# Patient Record
Sex: FEMALE | Race: WHITE | NOT HISPANIC OR LATINO | Employment: OTHER | URBAN - METROPOLITAN AREA
[De-identification: names, ages, dates, MRNs, and addresses within clinical notes are randomized per-mention and may not be internally consistent; named-entity substitution may affect disease eponyms.]

---

## 2017-02-07 ENCOUNTER — HOSPITAL ENCOUNTER (OUTPATIENT)
Dept: LAB | Facility: MEDICAL CENTER | Age: 72
End: 2017-02-07
Attending: INTERNAL MEDICINE
Payer: MEDICARE

## 2017-02-07 DIAGNOSIS — E78.5 DYSLIPIDEMIA: ICD-10-CM

## 2017-02-07 LAB
ALBUMIN SERPL BCP-MCNC: 4 G/DL (ref 3.2–4.9)
ALBUMIN/GLOB SERPL: 1.5 G/DL
ALP SERPL-CCNC: 76 U/L (ref 30–99)
ALT SERPL-CCNC: 28 U/L (ref 2–50)
ANION GAP SERPL CALC-SCNC: 7 MMOL/L (ref 0–11.9)
AST SERPL-CCNC: 28 U/L (ref 12–45)
BILIRUB SERPL-MCNC: 0.7 MG/DL (ref 0.1–1.5)
BUN SERPL-MCNC: 13 MG/DL (ref 8–22)
CALCIUM SERPL-MCNC: 9.3 MG/DL (ref 8.5–10.5)
CHLORIDE SERPL-SCNC: 104 MMOL/L (ref 96–112)
CHOLEST SERPL-MCNC: 203 MG/DL (ref 100–199)
CO2 SERPL-SCNC: 28 MMOL/L (ref 20–33)
CREAT SERPL-MCNC: 0.71 MG/DL (ref 0.5–1.4)
GLOBULIN SER CALC-MCNC: 2.7 G/DL (ref 1.9–3.5)
GLUCOSE SERPL-MCNC: 93 MG/DL (ref 65–99)
HDLC SERPL-MCNC: 80 MG/DL
LDLC SERPL CALC-MCNC: 108 MG/DL
POTASSIUM SERPL-SCNC: 4.5 MMOL/L (ref 3.6–5.5)
PROT SERPL-MCNC: 6.7 G/DL (ref 6–8.2)
SODIUM SERPL-SCNC: 139 MMOL/L (ref 135–145)
TRIGL SERPL-MCNC: 75 MG/DL (ref 0–149)

## 2017-02-07 PROCEDURE — 36415 COLL VENOUS BLD VENIPUNCTURE: CPT

## 2017-02-07 PROCEDURE — 80053 COMPREHEN METABOLIC PANEL: CPT

## 2017-02-07 PROCEDURE — 80061 LIPID PANEL: CPT

## 2017-02-16 ENCOUNTER — OFFICE VISIT (OUTPATIENT)
Dept: CARDIOLOGY | Facility: MEDICAL CENTER | Age: 72
End: 2017-02-16
Payer: MEDICARE

## 2017-02-16 VITALS
OXYGEN SATURATION: 93 % | WEIGHT: 110 LBS | BODY MASS INDEX: 21.6 KG/M2 | HEIGHT: 60 IN | SYSTOLIC BLOOD PRESSURE: 128 MMHG | HEART RATE: 70 BPM | DIASTOLIC BLOOD PRESSURE: 60 MMHG

## 2017-02-16 DIAGNOSIS — I34.0 NON-RHEUMATIC MITRAL REGURGITATION: ICD-10-CM

## 2017-02-16 DIAGNOSIS — E78.5 DYSLIPIDEMIA: ICD-10-CM

## 2017-02-16 PROCEDURE — G8484 FLU IMMUNIZE NO ADMIN: HCPCS | Performed by: INTERNAL MEDICINE

## 2017-02-16 PROCEDURE — 1036F TOBACCO NON-USER: CPT | Performed by: INTERNAL MEDICINE

## 2017-02-16 PROCEDURE — 99213 OFFICE O/P EST LOW 20 MIN: CPT | Performed by: INTERNAL MEDICINE

## 2017-02-16 PROCEDURE — 3017F COLORECTAL CA SCREEN DOC REV: CPT | Mod: 8P | Performed by: INTERNAL MEDICINE

## 2017-02-16 PROCEDURE — G8419 CALC BMI OUT NRM PARAM NOF/U: HCPCS | Performed by: INTERNAL MEDICINE

## 2017-02-16 PROCEDURE — 1101F PT FALLS ASSESS-DOCD LE1/YR: CPT | Mod: 8P | Performed by: INTERNAL MEDICINE

## 2017-02-16 PROCEDURE — 4040F PNEUMOC VAC/ADMIN/RCVD: CPT | Mod: 8P | Performed by: INTERNAL MEDICINE

## 2017-02-16 PROCEDURE — 3014F SCREEN MAMMO DOC REV: CPT | Performed by: INTERNAL MEDICINE

## 2017-02-16 PROCEDURE — G8432 DEP SCR NOT DOC, RNG: HCPCS | Performed by: INTERNAL MEDICINE

## 2017-02-16 RX ORDER — EZETIMIBE 10 MG/1
10 TABLET ORAL DAILY
Qty: 30 TAB | Refills: 11 | Status: SHIPPED | OUTPATIENT
Start: 2017-02-16 | End: 2019-02-05

## 2017-02-16 ASSESSMENT — ENCOUNTER SYMPTOMS
DEPRESSION: 0
PND: 0
DOUBLE VISION: 0
ABDOMINAL PAIN: 0
DIZZINESS: 0
HEADACHES: 0
BLURRED VISION: 0
LOSS OF CONSCIOUSNESS: 0
COUGH: 0
MYALGIAS: 0
BLOOD IN STOOL: 0
PALPITATIONS: 0
FEVER: 0
CLAUDICATION: 0
BRUISES/BLEEDS EASILY: 0
NERVOUS/ANXIOUS: 0
ORTHOPNEA: 0
CHILLS: 0
SHORTNESS OF BREATH: 0

## 2017-02-16 NOTE — MR AVS SNAPSHOT
Estrella Mariana   2017 11:00 AM   Office Visit   MRN: [id number]    Department:  Heart [facility]   Dept Phone:  [phone]    Description:  Female : 1945   Provider:  Lor Solomon M.D.           Allergies as of 2017     Allergen Noted Reactions    Nkda [No Known Drug Allergy] 2010         You were diagnosed with     Dyslipidemia   [238651]       Non-rheumatic mitral regurgitation   [012213]         Vital Signs     Blood Pressure Pulse Height Weight Body Mass Index Oxygen Saturation    128/60 mmHg 70 1.524 m (5') 49.896 kg (110 lb) 21.48 kg/m2 93%    Smoking Status                   Former Smoker           Basic Information     Date Of Birth Sex Race Ethnicity Preferred Language    1945 Female White Non- English      Health Maintenance        Date Due Completion Dates    IMM DTaP/Tdap/Td Vaccine (1 - Tdap) 1962 ---    PAP SMEAR 1964 ---    COLONOSCOPY 1993 ---    IMM ZOSTER VACCINE 2003 ---    IMM PNEUMOCOCCAL 65+ (ADULT) LOW/MEDIUM RISK SERIES (1 of 2 - PCV13) 2008 ---    IMM INFLUENZA (1) 2016    MAMMOGRAM 10/5/2016 10/5/2015, 10/2/2014, 10/1/2013, 2012, 2011, 2010, 2010, 2009, 2009, 2008, 2008, 2007, 2007, 3/18/2005    BONE DENSITY 2019, 2012, 10/13/2009            Current Immunizations     Influenza Vaccine Adult HD 2014      Below and/or attached are the medications your provider expects you to take. Review all of your home medications and newly ordered medications with your provider and/or pharmacist. Follow medication instructions as directed by your provider and/or pharmacist. Please keep your medication list with you and share with your provider. Update the information when medications are discontinued, doses are changed, or new medications (including over-the-counter products) are added; and carry medication information at all times in the event of  emergency situations     Allergies:  NKDA - (reactions not documented)               Medications  Valid as of: February 16, 2017 - 11:33 AM    Generic Name Brand Name Tablet Size Instructions for use    Alendronate Sodium (Tab) FOSAMAX 70 MG Take 70 mg by mouth every 7 days. On Tue        Aspirin (Tablet Delayed Response) ECOTRIN 81 MG Take 81 mg by mouth every 72 hours.        Atorvastatin Calcium (Tab) LIPITOR 10 MG Take 10 mg by mouth every evening.        Cyanocobalamin (Tab) VITAMIN B-12 500 MCG Take 500 mcg by mouth every day.        Donepezil HCl (TABLET DISPERSIBLE) DONEPEZIL HYDROCHLORIDE 10 MG Take  by mouth.        Ezetimibe (Tab) ZETIA 10 MG Take 1 Tab by mouth every day.        Multiple Vitamins-Minerals (Tab) THERAGRAN-M  Take 1 Tab by mouth every day.        Omeprazole (CAPSULE DELAYED RELEASE) PRILOSEC 20 MG Take 20 mg by mouth every day.        Red Yeast Rice Extract   Take 2 Caps by mouth every day.        .                 Medicines prescribed today were sent to:     [id number] - [place], [place] - [address]    [address] [place] [address]    Phone: [phone] Fax: [phone]    Open 24 Hours?: No      Medication refill instructions:       If your prescription bottle indicates you have medication refills left, it is not necessary to call your provider’s office. Please contact your pharmacy and they will refill your medication.    If your prescription bottle indicates you do not have any refills left, you may request refills at any time through one of the following ways: The online Koru system (except Urgent Care), by calling your provider’s office, or by asking your pharmacy to contact your provider’s office with a refill request. Medication refills are processed only during regular business hours and may not be available until the next business day. Your provider may request additional information or to have a follow-up visit with you prior to refilling your medication.   *Please  Note: Medication refills are assigned a new Rx number when refilled electronically. Your pharmacy may indicate that no refills were authorized even though a new prescription for the same medication is available at the pharmacy. Please request the medicine by name with the pharmacy before contacting your provider for a refill.        Your To Do List     Future Labs/Procedures Complete By Expires    COMP METABOLIC PANEL  As directed 2/16/2018    COMP METABOLIC PANEL  As directed 2/16/2018    LIPID PROFILE  As directed 2/16/2018    LIPID PROFILE  As directed 2/16/2018         Casmul Access Code: [email or web address]  Expires: 3/9/2017  7:37 AM    Casmul  A secure, online tool to manage your health information     [employer] Casmul® is a secure, online tool that connects you to your personalized health information from the privacy of your home -- day or night - making it very easy for you to manage your healthcare. Once the activation process is completed, you can even access your medical information using the Casmul derik, which is available for free in the Apple Derik store or Google Play store.     Casmul provides the following levels of access (as shown below):   My Chart Features   [facility] Doctor [facility]  Specialists [facility]  Urgent  Care Non-[facility]  Primary Care  Doctor   Email your healthcare team securely and privately 24/7 X X X    Manage appointments: schedule your next appointment; view details of past/upcoming appointments X      Request prescription refills. X      View recent personal medical records, including lab and immunizations X X X X   View health record, including health history, allergies, medications X X X X   Read reports about your outpatient visits, procedures, consult and ER notes X X X X   See your discharge summary, which is a recap of your hospital and/or ER visit that includes your diagnosis, lab results, and care plan. X X       How to register for Casmul:  1. Go to   [email or web address].  2. Click on the Sign Up Now box, which takes you to the New Member Sign Up page. You will need to provide the following information:  a. Enter your Predikt Access Code exactly as it appears at the top of this page. (You will not need to use this code after you’ve completed the sign-up process. If you do not sign up before the expiration date, you must request a new code.)   b. Enter your date of birth.   c. Enter your home email address.   d. Click Submit, and follow the next screen’s instructions.  3. Create a Predikt ID. This will be your Predikt login ID and cannot be changed, so think of one that is secure and easy to remember.  4. Create a "[employer]t password. You can change your password at any time.  5. Enter your Password Reset Question and Answer. This can be used at a later time if you forget your password.   6. Enter your e-mail address. This allows you to receive e-mail notifications when new information is available in Predikt.  7. Click Sign Up. You can now view your health information.    For assistance activating your Predikt account, call [phone]

## 2017-02-16 NOTE — PROGRESS NOTES
Subjective:   Estrella Peña is a 71 y.o. female with prior history of bronchitis, dyslipidemia presenting today for follow-up evaluation of dyslipidemia.     Patient denied any complaints of exertional chest pain, pressure or tightness. No further episodes of palpitations. No complaints of dizziness, lightheadedness or LOC. No complaints of lower extremity edema or claudication. Denied any myalgias on Lipitor.      Past Medical History   Diagnosis Date   • Bronchitis    • Breath shortness      with exersion   • Unspecified disorder of thyroid 1968     no longer     Past Surgical History   Procedure Laterality Date   • Rhytidectomy  12/8/2010     Performed by ALCIRA SALCEDO at SURGERY [facility]   • Other  2008     urethral repair     Family History   Problem Relation Age of Onset   • Diabetes     • Cancer     • Cancer Mother    • Hypertension Sister    • Stroke Sister    • Other Father      Dementia     History   Smoking status   • Former Smoker -- 1.00 packs/day for 1 years   • Types: Cigarettes   • Quit date: 01/01/1965   Smokeless tobacco   • Never Used     Allergies   Allergen Reactions   • Nkda [No Known Drug Allergy]      Outpatient Encounter Prescriptions as of 2/16/2017   Medication Sig Dispense Refill   • atorvastatin (LIPITOR) 10 MG Tab Take 10 mg by mouth every evening.     • DONEPEZIL HYDROCHLORIDE 10 MG TABLET DISPERSIBLE Take  by mouth.     • cyanocobalamin (VITAMIN B-12) 500 MCG Tab Take 500 mcg by mouth every day.     • Red Yeast Rice Extract (RED YEAST RICE PO) Take 2 Caps by mouth every day.     • aspirin EC (ECOTRIN) 81 MG Tablet Delayed Response Take 81 mg by mouth every 72 hours.     • omeprazole (PRILOSEC) 20 MG delayed-release capsule Take 20 mg by mouth every day.     • therapeutic multivitamin-minerals (THERAGRAN-M) Tab Take 1 Tab by mouth every day.     • alendronate (FOSAMAX) 70 MG Tab Take 70 mg by mouth every 7 days. On Tue       No facility-administered encounter medications on  file as of 2/16/2017.     Review of Systems   Constitutional: Negative for fever and chills.   Eyes: Negative for blurred vision and double vision.   Respiratory: Negative for cough and shortness of breath.    Cardiovascular: Negative for chest pain, palpitations (None now), orthopnea, claudication, leg swelling and PND.   Gastrointestinal: Negative for abdominal pain, blood in stool and melena.   Genitourinary: Negative for dysuria.   Musculoskeletal: Negative for myalgias and joint pain.   Skin: Negative for rash.   Neurological: Negative for dizziness, loss of consciousness and headaches.   Endo/Heme/Allergies: Does not bruise/bleed easily.   Psychiatric/Behavioral: Negative for depression. The patient is not nervous/anxious.         Objective:   /60 mmHg  Pulse 70  Ht 1.524 m (5')  Wt 49.896 kg (110 lb)  BMI 21.48 kg/m2  SpO2 93%    Physical Exam   Constitutional: She is oriented to person, place, and time. She appears well-developed and well-nourished.   HENT:   Head: Normocephalic and atraumatic.   Eyes: Pupils are equal, round, and reactive to light. Right eye exhibits no discharge. Left eye exhibits no discharge.   Neck: No JVD present. No tracheal deviation present.   Cardiovascular: Normal rate and regular rhythm.    Murmur heard.  Pulses:       Carotid pulses are 2+ on the right side, and 2+ on the left side.       Radial pulses are 2+ on the right side, and 2+ on the left side.        Posterior tibial pulses are 2+ on the right side, and 2+ on the left side.   Systolic murmur best heard in the mitral area   Pulmonary/Chest: Breath sounds normal. No respiratory distress. She has no wheezes. She has no rales.   Abdominal: Soft. Bowel sounds are normal. She exhibits no distension. There is no tenderness. There is no rebound and no guarding.   Musculoskeletal: Normal range of motion. She exhibits no edema.   Neurological: She is alert and oriented to person, place, and time. No cranial nerve  deficit.   Skin: Skin is warm and dry. She is not diaphoretic. No erythema.   Psychiatric: She has a normal mood and affect.   Results for ROSALINO BRADY (MRN [id number]) as of 2/16/2017 12:10   9/12/2016 2/7/2017    Sodium 137 139   Potassium 3.5 (L) 4.5   Chloride 104 104   Co2 25 28   Anion Gap 8.0 7.0   Glucose 90 93   Bun 10 13   Creatinine 0.61 0.71   GFR If Non African American >60 >60   Calcium 9.2 9.3   AST(SGOT) 30 28   ALT(SGPT) 23 28   Cholesterol,Tot  203 (H)   Triglycerides  75   HDL  80   LDL  108 (H)       TTE: 10/14/16  No prior study is available for comparison.   Normal left ventricular size, wall thickness, and systolic function.  Left ventricular ejection fraction is visually estimated to be 60%. Grade I diastolic dysfunction.  The right ventricle was normal in size and function.  Mild mitral regurgitation.  Mild tricuspid regurgitation. Estimated right ventricular systolic pressure  is 25 mmHg.  Mild pulmonic insufficiency.  Normal pericardium without effusion    Assessment:     1. Dyslipidemia  2. Mitral regurgitation    Medical Decision Making:  Today's Assessment / Status / Plan:     1. Continue Lipitor 10 mg qhs.  Start Zetia 10 mg daily next line check labs in 2 months and in one year  2. Stable on exam.  Repeat echo in 4 years prior to next visit.    Follow-up in one year.  Labs in 2 months than in one year.    Thank you for allowing me to participate in taking care of patient.    Lor Jenkins MD.    Thank you for allowing me to participate in taking care of patient.    Lor Jenkins MD.

## 2017-02-16 NOTE — Clinical Note
[facility]   [address]  [place] [address]  Phone: [phone]  Fax: [phone]              Estrella Peña  1945    Encounter Date: 2/16/2017    Lor Solomon M.D.          PROGRESS NOTE:  Subjective:   Estrella Peña is a 71 y.o. female with prior history of bronchitis, dyslipidemia presenting today for follow-up evaluation of dyslipidemia.     Patient denied any complaints of exertional chest pain, pressure or tightness. No further episodes of palpitations. No complaints of dizziness, lightheadedness or LOC. No complaints of lower extremity edema or claudication. Denied any myalgias on Lipitor.      Past Medical History   Diagnosis Date   • Bronchitis    • Breath shortness      with exersion   • Unspecified disorder of thyroid 1968     no longer     Past Surgical History   Procedure Laterality Date   • Rhytidectomy  12/8/2010     Performed by ALCIRA SALCEDO at SURGERY [facility]   • Other  2008     urethral repair     Family History   Problem Relation Age of Onset   • Diabetes     • Cancer     • Cancer Mother    • Hypertension Sister    • Stroke Sister    • Other Father      Dementia     History   Smoking status   • Former Smoker -- 1.00 packs/day for 1 years   • Types: Cigarettes   • Quit date: 01/01/1965   Smokeless tobacco   • Never Used     Allergies   Allergen Reactions   • Nkda [No Known Drug Allergy]      Outpatient Encounter Prescriptions as of 2/16/2017   Medication Sig Dispense Refill   • atorvastatin (LIPITOR) 10 MG Tab Take 10 mg by mouth every evening.     • DONEPEZIL HYDROCHLORIDE 10 MG TABLET DISPERSIBLE Take  by mouth.     • cyanocobalamin (VITAMIN B-12) 500 MCG Tab Take 500 mcg by mouth every day.     • Red Yeast Rice Extract (RED YEAST RICE PO) Take 2 Caps by mouth every day.     • aspirin EC (ECOTRIN) 81 MG Tablet Delayed Response Take 81 mg by mouth every 72 hours.     • omeprazole (PRILOSEC) 20 MG  delayed-release capsule Take 20 mg by mouth every day.     • therapeutic multivitamin-minerals (THERAGRAN-M) Tab Take 1 Tab by mouth every day.     • alendronate (FOSAMAX) 70 MG Tab Take 70 mg by mouth every 7 days. On Tue       No facility-administered encounter medications on file as of 2/16/2017.     Review of Systems   Constitutional: Negative for fever and chills.   Eyes: Negative for blurred vision and double vision.   Respiratory: Negative for cough and shortness of breath.    Cardiovascular: Negative for chest pain, palpitations (None now), orthopnea, claudication, leg swelling and PND.   Gastrointestinal: Negative for abdominal pain, blood in stool and melena.   Genitourinary: Negative for dysuria.   Musculoskeletal: Negative for myalgias and joint pain.   Skin: Negative for rash.   Neurological: Negative for dizziness, loss of consciousness and headaches.   Endo/Heme/Allergies: Does not bruise/bleed easily.   Psychiatric/Behavioral: Negative for depression. The patient is not nervous/anxious.         Objective:   /60 mmHg  Pulse 70  Ht 1.524 m (5')  Wt 49.896 kg (110 lb)  BMI 21.48 kg/m2  SpO2 93%    Physical Exam   Constitutional: She is oriented to person, place, and time. She appears well-developed and well-nourished.   HENT:   Head: Normocephalic and atraumatic.   Eyes: Pupils are equal, round, and reactive to light. Right eye exhibits no discharge. Left eye exhibits no discharge.   Neck: No JVD present. No tracheal deviation present.   Cardiovascular: Normal rate and regular rhythm.    Murmur heard.  Pulses:       Carotid pulses are 2+ on the right side, and 2+ on the left side.       Radial pulses are 2+ on the right side, and 2+ on the left side.        Posterior tibial pulses are 2+ on the right side, and 2+ on the left side.   Systolic murmur best heard in the mitral area   Pulmonary/Chest: Breath sounds normal. No respiratory distress. She has no wheezes. She has no rales.   Abdominal:  Soft. Bowel sounds are normal. She exhibits no distension. There is no tenderness. There is no rebound and no guarding.   Musculoskeletal: Normal range of motion. She exhibits no edema.   Neurological: She is alert and oriented to person, place, and time. No cranial nerve deficit.   Skin: Skin is warm and dry. She is not diaphoretic. No erythema.   Psychiatric: She has a normal mood and affect.   Results for ROSALINO BRADY (MRN [id number]) as of 2/16/2017 12:10   9/12/2016 2/7/2017    Sodium 137 139   Potassium 3.5 (L) 4.5   Chloride 104 104   Co2 25 28   Anion Gap 8.0 7.0   Glucose 90 93   Bun 10 13   Creatinine 0.61 0.71   GFR If Non African American >60 >60   Calcium 9.2 9.3   AST(SGOT) 30 28   ALT(SGPT) 23 28   Cholesterol,Tot  203 (H)   Triglycerides  75   HDL  80   LDL  108 (H)       TTE: 10/14/16  No prior study is available for comparison.   Normal left ventricular size, wall thickness, and systolic function.  Left ventricular ejection fraction is visually estimated to be 60%. Grade I diastolic dysfunction.  The right ventricle was normal in size and function.  Mild mitral regurgitation.  Mild tricuspid regurgitation. Estimated right ventricular systolic pressure  is 25 mmHg.  Mild pulmonic insufficiency.  Normal pericardium without effusion    Assessment:     1. Dyslipidemia  2. Mitral regurgitation    Medical Decision Making:  Today's Assessment / Status / Plan:     1. Continue Lipitor 10 mg qhs.  Start Zetia 10 mg daily next line check labs in 2 months and in one year  2. Stable on exam.  Repeat echo in 4 years prior to next visit.    Follow-up in one year.  Labs in 2 months than in one year.    Thank you for allowing me to participate in taking care of patient.    Lor Jenkins MD.    Thank you for allowing me to participate in taking care of patient.    Lor Jenkins MD.        Anne Beltrán M.D.  [address]  [place] [address]  VIA Facsimile: [phone]

## 2017-04-05 ENCOUNTER — HOSPITAL ENCOUNTER (OUTPATIENT)
Facility: MEDICAL CENTER | Age: 72
End: 2017-04-05
Attending: PHYSICIAN ASSISTANT
Payer: MEDICARE

## 2017-04-05 LAB — CYTOLOGY REG CYTOL: NORMAL

## 2017-04-05 PROCEDURE — 88112 CYTOPATH CELL ENHANCE TECH: CPT

## 2017-04-13 ENCOUNTER — HOSPITAL ENCOUNTER (OUTPATIENT)
Dept: LAB | Facility: MEDICAL CENTER | Age: 72
End: 2017-04-13
Attending: INTERNAL MEDICINE
Payer: MEDICARE

## 2017-04-13 DIAGNOSIS — E78.5 DYSLIPIDEMIA: ICD-10-CM

## 2017-04-13 LAB
ALBUMIN SERPL BCP-MCNC: 3.9 G/DL (ref 3.2–4.9)
ALBUMIN/GLOB SERPL: 1.4 G/DL
ALP SERPL-CCNC: 60 U/L (ref 30–99)
ALT SERPL-CCNC: 28 U/L (ref 2–50)
ANION GAP SERPL CALC-SCNC: 5 MMOL/L (ref 0–11.9)
AST SERPL-CCNC: 27 U/L (ref 12–45)
BILIRUB SERPL-MCNC: 0.4 MG/DL (ref 0.1–1.5)
BUN SERPL-MCNC: 14 MG/DL (ref 8–22)
CALCIUM SERPL-MCNC: 9 MG/DL (ref 8.5–10.5)
CHLORIDE SERPL-SCNC: 103 MMOL/L (ref 96–112)
CHOLEST SERPL-MCNC: 205 MG/DL (ref 100–199)
CO2 SERPL-SCNC: 29 MMOL/L (ref 20–33)
CREAT SERPL-MCNC: 0.63 MG/DL (ref 0.5–1.4)
GFR SERPL CREATININE-BSD FRML MDRD: >60 ML/MIN/1.73 M 2
GLOBULIN SER CALC-MCNC: 2.8 G/DL (ref 1.9–3.5)
GLUCOSE SERPL-MCNC: 93 MG/DL (ref 65–99)
HDLC SERPL-MCNC: 84 MG/DL
LDLC SERPL CALC-MCNC: 111 MG/DL
POTASSIUM SERPL-SCNC: 4.4 MMOL/L (ref 3.6–5.5)
PROT SERPL-MCNC: 6.7 G/DL (ref 6–8.2)
SODIUM SERPL-SCNC: 137 MMOL/L (ref 135–145)
TRIGL SERPL-MCNC: 48 MG/DL (ref 0–149)

## 2017-04-13 PROCEDURE — 80053 COMPREHEN METABOLIC PANEL: CPT

## 2017-04-13 PROCEDURE — 36415 COLL VENOUS BLD VENIPUNCTURE: CPT

## 2017-04-13 PROCEDURE — 80061 LIPID PANEL: CPT

## 2017-04-24 ENCOUNTER — TELEPHONE (OUTPATIENT)
Dept: CARDIOLOGY | Facility: MEDICAL CENTER | Age: 72
End: 2017-04-24

## 2017-04-24 DIAGNOSIS — E78.49 OTHER HYPERLIPIDEMIA: ICD-10-CM

## 2017-04-24 PROBLEM — I34.0 MITRAL REGURGITATION: Status: ACTIVE | Noted: 2017-04-24

## 2017-04-24 PROBLEM — E78.5 HYPERLIPIDEMIA: Status: ACTIVE | Noted: 2017-04-24

## 2017-04-24 NOTE — TELEPHONE ENCOUNTER
Spoke with patient to discuss lipid panel results and she states that she is only taking the zetia as she thought that because AM said she was doing well she was to stop the lipitor and start the zetia - she did not realize that she was to take them together so she stopped taking the lipitor. I explained to her that is probably why we did not see her cholesterol come down and in fact it went up just a couple points.  I explained to her why we use zetia in combination with a statin drug and she verbalized understanding of the need to take both and will refill her zetia and start taking both together and will recheck her labs in 2 months when she comes back in town the first week in July - she requested that lab slip be mailed to her and I confirmed that I will get it out in the mail to her today - confirmed address

## 2017-05-04 ENCOUNTER — HOSPITAL ENCOUNTER (OUTPATIENT)
Dept: RADIOLOGY | Facility: MEDICAL CENTER | Age: 72
End: 2017-05-04
Attending: FAMILY MEDICINE
Payer: MEDICARE

## 2017-05-04 DIAGNOSIS — Z13.9 SCREENING: ICD-10-CM

## 2017-05-04 PROCEDURE — 77063 BREAST TOMOSYNTHESIS BI: CPT

## 2017-05-12 ENCOUNTER — HOSPITAL ENCOUNTER (OUTPATIENT)
Dept: RADIOLOGY | Facility: MEDICAL CENTER | Age: 72
End: 2017-05-12
Attending: ORTHOPAEDIC SURGERY
Payer: MEDICARE

## 2017-05-12 DIAGNOSIS — M48.061 SPINAL STENOSIS, LUMBAR REGION, WITHOUT NEUROGENIC CLAUDICATION: ICD-10-CM

## 2017-05-12 DIAGNOSIS — M51.36 DEGENERATION OF LUMBAR INTERVERTEBRAL DISC: ICD-10-CM

## 2017-05-12 PROCEDURE — 72148 MRI LUMBAR SPINE W/O DYE: CPT

## 2017-06-23 ENCOUNTER — OFFICE VISIT (OUTPATIENT)
Dept: CARDIOLOGY | Facility: MEDICAL CENTER | Age: 72
End: 2017-06-23
Payer: MEDICARE

## 2017-06-23 VITALS
HEART RATE: 78 BPM | SYSTOLIC BLOOD PRESSURE: 102 MMHG | DIASTOLIC BLOOD PRESSURE: 68 MMHG | WEIGHT: 109 LBS | HEIGHT: 60 IN | OXYGEN SATURATION: 93 % | BODY MASS INDEX: 21.4 KG/M2

## 2017-06-23 DIAGNOSIS — E78.5 DYSLIPIDEMIA: ICD-10-CM

## 2017-06-23 DIAGNOSIS — R00.2 PALPITATIONS: ICD-10-CM

## 2017-06-23 DIAGNOSIS — I34.0 NON-RHEUMATIC MITRAL REGURGITATION: ICD-10-CM

## 2017-06-23 PROCEDURE — 99214 OFFICE O/P EST MOD 30 MIN: CPT | Performed by: INTERNAL MEDICINE

## 2017-06-23 PROCEDURE — 93000 ELECTROCARDIOGRAM COMPLETE: CPT | Performed by: INTERNAL MEDICINE

## 2017-06-23 ASSESSMENT — ENCOUNTER SYMPTOMS
CHILLS: 0
NERVOUS/ANXIOUS: 0
FEVER: 0
COUGH: 0
HEADACHES: 0
SHORTNESS OF BREATH: 0
BLURRED VISION: 0
LOSS OF CONSCIOUSNESS: 0
BLOOD IN STOOL: 0
DEPRESSION: 0
MYALGIAS: 0
ORTHOPNEA: 0
BRUISES/BLEEDS EASILY: 0
CLAUDICATION: 0
PND: 0
ABDOMINAL PAIN: 0
DOUBLE VISION: 0
DIZZINESS: 0
PALPITATIONS: 0

## 2017-06-23 NOTE — PROGRESS NOTES
Subjective:   Estrella Peña is a 71 y.o. female with prior history of bronchitis, dyslipidemia recently saw Dr. Oro 2 to irregular heartbeat patient was advised to see me in office.    Patient denied any complaints of palpitations. Patient currently on Red rice yeast. No complaints of dizziness, lightheadedness or LOC. No complaints of lower extremity edema or claudication. No complaints of exertional chest pain pressure or tightness. Denied any complaints of palpitations orthopnea or PND.  Past Medical History   Diagnosis Date   • Bronchitis    • Breath shortness      With exertion   • Unspecified disorder of thyroid 1968     No longer   • Hyperlipidemia    • Mitral regurgitation      Past Surgical History   Procedure Laterality Date   • Rhytidectomy  12/8/2010     Performed by ALCIRA SALCEDO at [facility] ORS   • Other  2008     urethral repair     Family History   Problem Relation Age of Onset   • Diabetes     • Cancer     • Cancer Mother    • Hypertension Sister    • Stroke Sister    • Other Father      Dementia     History   Smoking status   • Former Smoker -- 1.00 packs/day for 1 years   • Types: Cigarettes   • Quit date: 01/01/1965   Smokeless tobacco   • Never Used     Allergies   Allergen Reactions   • Nkda [No Known Drug Allergy]      Outpatient Encounter Prescriptions as of 6/23/2017   Medication Sig Dispense Refill   • ezetimibe (ZETIA) 10 MG Tab Take 1 Tab by mouth every day. 30 Tab 11   • atorvastatin (LIPITOR) 10 MG Tab Take 10 mg by mouth every evening.     • DONEPEZIL HYDROCHLORIDE 10 MG TABLET DISPERSIBLE Take  by mouth.     • aspirin EC (ECOTRIN) 81 MG Tablet Delayed Response Take 81 mg by mouth every 72 hours.     • omeprazole (PRILOSEC) 20 MG delayed-release capsule Take 20 mg by mouth every day.     • therapeutic multivitamin-minerals (THERAGRAN-M) Tab Take 1 Tab by mouth every day.     • alendronate (FOSAMAX) 70 MG Tab Take 70 mg by mouth every 7 days. On Tue     •  cyanocobalamin (VITAMIN B-12) 500 MCG Tab Take 500 mcg by mouth every day.     • Red Yeast Rice Extract (RED YEAST RICE PO) Take 2 Caps by mouth every day.       No facility-administered encounter medications on file as of 6/23/2017.     Review of Systems   Constitutional: Negative for fever and chills.   Eyes: Negative for blurred vision and double vision.   Respiratory: Negative for cough and shortness of breath.    Cardiovascular: Negative for chest pain, palpitations (None now), orthopnea, claudication, leg swelling and PND.   Gastrointestinal: Negative for abdominal pain, blood in stool and melena.   Genitourinary: Negative for dysuria.   Musculoskeletal: Negative for myalgias and joint pain.   Skin: Negative for rash.   Neurological: Negative for dizziness, loss of consciousness and headaches.   Endo/Heme/Allergies: Does not bruise/bleed easily.   Psychiatric/Behavioral: Negative for depression. The patient is not nervous/anxious.         Objective:   /68 mmHg  Pulse 78  Ht 1.524 m (5')  Wt 49.442 kg (109 lb)  BMI 21.29 kg/m2  SpO2 93%    Physical Exam   Constitutional: She is oriented to person, place, and time. She appears well-developed and well-nourished.   HENT:   Head: Normocephalic and atraumatic.   Eyes: Pupils are equal, round, and reactive to light. Right eye exhibits no discharge. Left eye exhibits no discharge.   Neck: No JVD present. No tracheal deviation present.   Cardiovascular: Normal rate.  A regularly irregular rhythm present.   Murmur heard.  Pulses:       Carotid pulses are 2+ on the right side, and 2+ on the left side.       Radial pulses are 2+ on the right side, and 2+ on the left side.        Posterior tibial pulses are 2+ on the right side, and 2+ on the left side.   Systolic murmur best heard in the mitral area   Pulmonary/Chest: Breath sounds normal. No respiratory distress. She has no wheezes. She has no rales.   Abdominal: Soft. Bowel sounds are normal. She exhibits no  distension. There is no tenderness. There is no rebound and no guarding.   Musculoskeletal: Normal range of motion. She exhibits no edema.   Neurological: She is alert and oriented to person, place, and time. No cranial nerve deficit.   Skin: Skin is warm and dry. She is not diaphoretic. No erythema.   Psychiatric: She has a normal mood and affect.     EK17  EKG personally reviewed by me.   Sinus rhythm 60 bpm, less than 0.5 mm ST depression in inferolateral leads. Unchanged from prior EKG.    Results for ROSALINO BRADY (MRN [id number]) as of 2017 10:23   2017    Sodium 139 137   Potassium 4.5 4.4   Chloride 104 103   Co2 28 29   Anion Gap 7.0 5.0   Glucose 93 93   Bun 13 14   Creatinine 0.71 0.63   GFR If Non African American >60 >60   Calcium 9.3 9.0   AST(SGOT) 28 27   ALT(SGPT) 28 28   Alkaline Phosphatase 76 60   Cholesterol,Tot 203 (H) 205 (H)   Triglycerides 75 48   HDL 80 84    (H) 111 (H)     TTE: 10/14/16  No prior study is available for comparison.   Normal left ventricular size, wall thickness, and systolic function.  Left ventricular ejection fraction is visually estimated to be 60%. Grade I diastolic dysfunction.  The right ventricle was normal in size and function.  Mild mitral regurgitation.  Mild tricuspid regurgitation. Estimated right ventricular systolic pressure  is 25 mmHg.  Mild pulmonic insufficiency.  Normal pericardium without effusion    Assessment:     1. Dyslipidemia  2. Mitral regurgitation  3. Irregular heartbeat  Medical Decision Making:  Today's Assessment / Status / Plan:     1. Continue Red Rice yeast.  Start Zetia 10 mg daily.  Check labs in 6 weeks.  2. Repeat echocardiogram in 4 years.  3. 24-hour Holter monitor prior to next visit.    Follow-up in 8 weeks.  24-hour Holter monitor prior to next visit.  Labs prior to next visit.    Thank you for allowing me to participate in taking care of patient.    Lor Jenkins MD.

## 2017-06-23 NOTE — Clinical Note
[facility]   [address]   [address]  [place] [address]  Phone: [phone]  Fax: [phone]              Estrella Peña  1945    Encounter Date: 6/23/2017    Lor Soloomn M.D.          PROGRESS NOTE:  Subjective:   Estrella Peña is a 71 y.o. female with prior history of bronchitis, dyslipidemia recently saw Dr. Oro 2 to irregular heartbeat patient was advised to see me in office.    Patient denied any complaints of palpitations. Patient currently on Red rice yeast. No complaints of dizziness, lightheadedness or LOC. No complaints of lower extremity edema or claudication. No complaints of exertional chest pain pressure or tightness. Denied any complaints of palpitations orthopnea or PND.  Past Medical History   Diagnosis Date   • Bronchitis    • Breath shortness      With exertion   • Unspecified disorder of thyroid 1968     No longer   • Hyperlipidemia    • Mitral regurgitation      Past Surgical History   Procedure Laterality Date   • Rhytidectomy  12/8/2010     Performed by ALCIRA SALCEDO at [facility]   • Other  2008     urethral repair     Family History   Problem Relation Age of Onset   • Diabetes     • Cancer     • Cancer Mother    • Hypertension Sister    • Stroke Sister    • Other Father      Dementia     History   Smoking status   • Former Smoker -- 1.00 packs/day for 1 years   • Types: Cigarettes   • Quit date: 01/01/1965   Smokeless tobacco   • Never Used     Allergies   Allergen Reactions   • Nkda [No Known Drug Allergy]      Outpatient Encounter Prescriptions as of 6/23/2017   Medication Sig Dispense Refill   • ezetimibe (ZETIA) 10 MG Tab Take 1 Tab by mouth every day. 30 Tab 11   • atorvastatin (LIPITOR) 10 MG Tab Take 10 mg by mouth every evening.     • DONEPEZIL HYDROCHLORIDE 10 MG TABLET DISPERSIBLE Take  by mouth.     • aspirin EC (ECOTRIN) 81 MG Tablet Delayed Response Take 81 mg by mouth every  72 hours.     • omeprazole (PRILOSEC) 20 MG delayed-release capsule Take 20 mg by mouth every day.     • therapeutic multivitamin-minerals (THERAGRAN-M) Tab Take 1 Tab by mouth every day.     • alendronate (FOSAMAX) 70 MG Tab Take 70 mg by mouth every 7 days. On Tue     • cyanocobalamin (VITAMIN B-12) 500 MCG Tab Take 500 mcg by mouth every day.     • Red Yeast Rice Extract (RED YEAST RICE PO) Take 2 Caps by mouth every day.       No facility-administered encounter medications on file as of 6/23/2017.     Review of Systems   Constitutional: Negative for fever and chills.   Eyes: Negative for blurred vision and double vision.   Respiratory: Negative for cough and shortness of breath.    Cardiovascular: Negative for chest pain, palpitations (None now), orthopnea, claudication, leg swelling and PND.   Gastrointestinal: Negative for abdominal pain, blood in stool and melena.   Genitourinary: Negative for dysuria.   Musculoskeletal: Negative for myalgias and joint pain.   Skin: Negative for rash.   Neurological: Negative for dizziness, loss of consciousness and headaches.   Endo/Heme/Allergies: Does not bruise/bleed easily.   Psychiatric/Behavioral: Negative for depression. The patient is not nervous/anxious.         Objective:   /68 mmHg  Pulse 78  Ht 1.524 m (5')  Wt 49.442 kg (109 lb)  BMI 21.29 kg/m2  SpO2 93%    Physical Exam   Constitutional: She is oriented to person, place, and time. She appears well-developed and well-nourished.   HENT:   Head: Normocephalic and atraumatic.   Eyes: Pupils are equal, round, and reactive to light. Right eye exhibits no discharge. Left eye exhibits no discharge.   Neck: No JVD present. No tracheal deviation present.   Cardiovascular: Normal rate.  A regularly irregular rhythm present.   Murmur heard.  Pulses:       Carotid pulses are 2+ on the right side, and 2+ on the left side.       Radial pulses are 2+ on the right side, and 2+ on the left side.        Posterior  tibial pulses are 2+ on the right side, and 2+ on the left side.   Systolic murmur best heard in the mitral area   Pulmonary/Chest: Breath sounds normal. No respiratory distress. She has no wheezes. She has no rales.   Abdominal: Soft. Bowel sounds are normal. She exhibits no distension. There is no tenderness. There is no rebound and no guarding.   Musculoskeletal: Normal range of motion. She exhibits no edema.   Neurological: She is alert and oriented to person, place, and time. No cranial nerve deficit.   Skin: Skin is warm and dry. She is not diaphoretic. No erythema.   Psychiatric: She has a normal mood and affect.     EK17  EKG personally reviewed by me.   Sinus rhythm 60 bpm, less than 0.5 mm ST depression in inferolateral leads. Unchanged from prior EKG.    Results for ROSALINO BRADY (MRN [id number]) as of 2017 10:23   2017    Sodium 139 137   Potassium 4.5 4.4   Chloride 104 103   Co2 28 29   Anion Gap 7.0 5.0   Glucose 93 93   Bun 13 14   Creatinine 0.71 0.63   GFR If Non African American >60 >60   Calcium 9.3 9.0   AST(SGOT) 28 27   ALT(SGPT) 28 28   Alkaline Phosphatase 76 60   Cholesterol,Tot 203 (H) 205 (H)   Triglycerides 75 48   HDL 80 84    (H) 111 (H)     TTE: 10/14/16  No prior study is available for comparison.   Normal left ventricular size, wall thickness, and systolic function.  Left ventricular ejection fraction is visually estimated to be 60%. Grade I diastolic dysfunction.  The right ventricle was normal in size and function.  Mild mitral regurgitation.  Mild tricuspid regurgitation. Estimated right ventricular systolic pressure  is 25 mmHg.  Mild pulmonic insufficiency.  Normal pericardium without effusion    Assessment:     1. Dyslipidemia  2. Mitral regurgitation  3. Irregular heartbeat  Medical Decision Making:  Today's Assessment / Status / Plan:     1. Continue Red Rice yeast.  Start Zetia 10 mg daily.  Check labs in 6 weeks.  2. Repeat echocardiogram in  4 years.  3. 24-hour Holter monitor prior to next visit.    Follow-up in 8 weeks.  24-hour Holter monitor prior to next visit.  Labs prior to next visit.    Thank you for allowing me to participate in taking care of patient.    Lor Solomon. MD.          Anne Beltrán M.D.  [address]  [address]  [facility] [address]  VIA Facsimile: [phone]

## 2017-06-23 NOTE — MR AVS SNAPSHOT
Estrella Sierramami   2017 10:00 AM   Office Visit   MRN: [id number]    Department:  Heart [facility] [place]   Dept Phone:  [phone]    Description:  Female : 1945   Provider:  Lor Solomon M.D.           Allergies as of 2017     Allergen Noted Reactions    Nkda [No Known Drug Allergy] 2010         You were diagnosed with     Non-rheumatic mitral regurgitation   [221129]       Dyslipidemia   [750015]       Palpitations   [785.1.ICD-9-CM]         Vital Signs     Blood Pressure Pulse Height Weight Body Mass Index Oxygen Saturation    102/68 mmHg 78 1.524 m (5') 49.442 kg (109 lb) 21.29 kg/m2 93%    Smoking Status                   Former Smoker           Basic Information     Date Of Birth Sex Race Ethnicity Preferred Language    1945 Female White Non- English      Problem List              ICD-10-CM Priority Class Noted - Resolved    Hyperlipidemia E78.5   2017 - Present    Mitral regurgitation I34.0   2017 - Present      Health Maintenance        Date Due Completion Dates    IMM DTaP/Tdap/Td Vaccine (1 - Tdap) 1962 ---    PAP SMEAR 1964 ---    COLONOSCOPY 1993 ---    IMM ZOSTER VACCINE 2003 ---    IMM PNEUMOCOCCAL 65+ (ADULT) LOW/MEDIUM RISK SERIES (1 of 2 - PCV13) 2008 ---    MAMMOGRAM 2018, 10/5/2015, 10/2/2014, 10/1/2013, 2012, 2011, 2010, 2010, 2009, 2009, 2008, 2008, 2007, 2007, 3/18/2005    BONE DENSITY 2019, 2012, 10/13/2009            Results       Current Immunizations     Influenza Vaccine Adult HD 2014      Below and/or attached are the medications your provider expects you to take. Review all of your home medications and newly ordered medications with your provider and/or pharmacist. Follow medication instructions as directed by your provider and/or pharmacist. Please keep your medication list with you and share with your provider. Update the  information when medications are discontinued, doses are changed, or new medications (including over-the-counter products) are added; and carry medication information at all times in the event of emergency situations     Allergies:  NKDA - (reactions not documented)               Medications  Valid as of: June 23, 2017 - 11:12 AM    Generic Name Brand Name Tablet Size Instructions for use    Alendronate Sodium (Tab) FOSAMAX 70 MG Take 70 mg by mouth every 7 days. On Tue        Aspirin (Tablet Delayed Response) ECOTRIN 81 MG Take 81 mg by mouth every 72 hours.        Atorvastatin Calcium (Tab) LIPITOR 10 MG Take 10 mg by mouth every evening.        Cyanocobalamin (Tab) VITAMIN B-12 500 MCG Take 500 mcg by mouth every day.        Donepezil HCl (TABLET DISPERSIBLE) DONEPEZIL HYDROCHLORIDE 10 MG Take  by mouth.        Ezetimibe (Tab) ZETIA 10 MG Take 1 Tab by mouth every day.        Multiple Vitamins-Minerals (Tab) THERAGRAN-M  Take 1 Tab by mouth every day.        Omeprazole (CAPSULE DELAYED RELEASE) PRILOSEC 20 MG Take 20 mg by mouth every day.        Red Yeast Rice Extract   Take 2 Caps by mouth every day.        .                 Medicines prescribed today were sent to:     [id number] - [place], [place] - [address]    [address] [place] [address]    Phone: [phone] Fax: [phone]    Open 24 Hours?: No      Medication refill instructions:       If your prescription bottle indicates you have medication refills left, it is not necessary to call your provider’s office. Please contact your pharmacy and they will refill your medication.    If your prescription bottle indicates you do not have any refills left, you may request refills at any time through one of the following ways: The online [employer] system (except Urgent Care), by calling your provider’s office, or by asking your pharmacy to contact your provider’s office with a refill request. Medication refills are processed only during regular business  hours and may not be available until the next business day. Your provider may request additional information or to have a follow-up visit with you prior to refilling your medication.   *Please Note: Medication refills are assigned a new Rx number when refilled electronically. Your pharmacy may indicate that no refills were authorized even though a new prescription for the same medication is available at the pharmacy. Please request the medicine by name with the pharmacy before contacting your provider for a refill.           Touchstorm Access Code: [email or web address]  Expires: 7/8/2017  4:14 AM    Touchstorm  A secure, online tool to manage your health information     [employer]’s Touchstorm® is a secure, online tool that connects you to your personalized health information from the privacy of your home -- day or night - making it very easy for you to manage your healthcare. Once the activation process is completed, you can even access your medical information using the Touchstorm derik, which is available for free in the Apple Derik store or Google Play store.     Touchstorm provides the following levels of access (as shown below):   My Chart Features   [facility]  Specialists [facility]  Urgent  Care Non-[facility]  Primary Care  Doctor   Email your healthcare team securely and privately 24/7 X X X    Manage appointments: schedule your next appointment; view details of past/upcoming appointments X      Request prescription refills. X      View recent personal medical records, including lab and immunizations X X X X   View health record, including health history, allergies, medications X X X X   Read reports about your outpatient visits, procedures, consult and ER notes X X X X   See your discharge summary, which is a recap of your hospital and/or ER visit that includes your diagnosis, lab results, and care plan. X X       How to register for Touchstorm:  1. Go to  [email or web address].  2. Click on the Sign Up  Now box, which takes you to the New Member Sign Up page. You will need to provide the following information:  a. Enter your WorldTV Access Code exactly as it appears at the top of this page. (You will not need to use this code after you’ve completed the sign-up process. If you do not sign up before the expiration date, you must request a new code.)   b. Enter your date of birth.   c. Enter your home email address.   d. Click Submit, and follow the next screen’s instructions.  3. Create a WorldTV ID. This will be your WorldTV login ID and cannot be changed, so think of one that is secure and easy to remember.  4. Create a WorldTV password. You can change your password at any time.  5. Enter your Password Reset Question and Answer. This can be used at a later time if you forget your password.   6. Enter your e-mail address. This allows you to receive e-mail notifications when new information is available in WorldTV.  7. Click Sign Up. You can now view your health information.    For assistance activating your WorldTV account, call [phone]

## 2017-06-25 LAB — EKG IMPRESSION: NORMAL

## 2017-07-13 ENCOUNTER — HOSPITAL ENCOUNTER (OUTPATIENT)
Dept: LAB | Facility: MEDICAL CENTER | Age: 72
End: 2017-07-13
Attending: FAMILY MEDICINE
Payer: MEDICARE

## 2017-07-13 LAB
ALBUMIN SERPL BCP-MCNC: 3.9 G/DL (ref 3.2–4.9)
ALBUMIN/GLOB SERPL: 1.5 G/DL
ALP SERPL-CCNC: 66 U/L (ref 30–99)
ALT SERPL-CCNC: 37 U/L (ref 2–50)
ANION GAP SERPL CALC-SCNC: 8 MMOL/L (ref 0–11.9)
AST SERPL-CCNC: 32 U/L (ref 12–45)
BASOPHILS # BLD AUTO: 0.7 % (ref 0–1.8)
BASOPHILS # BLD: 0.04 K/UL (ref 0–0.12)
BILIRUB SERPL-MCNC: 0.5 MG/DL (ref 0.1–1.5)
BUN SERPL-MCNC: 10 MG/DL (ref 8–22)
CALCIUM SERPL-MCNC: 9.1 MG/DL (ref 8.5–10.5)
CHLORIDE SERPL-SCNC: 106 MMOL/L (ref 96–112)
CHOLEST SERPL-MCNC: 152 MG/DL (ref 100–199)
CO2 SERPL-SCNC: 28 MMOL/L (ref 20–33)
CREAT SERPL-MCNC: 0.65 MG/DL (ref 0.5–1.4)
EOSINOPHIL # BLD AUTO: 0.09 K/UL (ref 0–0.51)
EOSINOPHIL NFR BLD: 1.6 % (ref 0–6.9)
ERYTHROCYTE [DISTWIDTH] IN BLOOD BY AUTOMATED COUNT: 53.3 FL (ref 35.9–50)
FOLATE SERPL-MCNC: >24 NG/ML
GFR SERPL CREATININE-BSD FRML MDRD: >60 ML/MIN/1.73 M 2
GLOBULIN SER CALC-MCNC: 2.6 G/DL (ref 1.9–3.5)
GLUCOSE SERPL-MCNC: 83 MG/DL (ref 65–99)
HCT VFR BLD AUTO: 42.9 % (ref 37–47)
HDLC SERPL-MCNC: 81 MG/DL
HGB BLD-MCNC: 13.7 G/DL (ref 12–16)
IMM GRANULOCYTES # BLD AUTO: 0.01 K/UL (ref 0–0.11)
IMM GRANULOCYTES NFR BLD AUTO: 0.2 % (ref 0–0.9)
LDLC SERPL CALC-MCNC: 63 MG/DL
LYMPHOCYTES # BLD AUTO: 2.44 K/UL (ref 1–4.8)
LYMPHOCYTES NFR BLD: 42.8 % (ref 22–41)
MCH RBC QN AUTO: 32.5 PG (ref 27–33)
MCHC RBC AUTO-ENTMCNC: 31.9 G/DL (ref 33.6–35)
MCV RBC AUTO: 101.9 FL (ref 81.4–97.8)
MONOCYTES # BLD AUTO: 0.75 K/UL (ref 0–0.85)
MONOCYTES NFR BLD AUTO: 13.2 % (ref 0–13.4)
NEUTROPHILS # BLD AUTO: 2.37 K/UL (ref 2–7.15)
NEUTROPHILS NFR BLD: 41.5 % (ref 44–72)
NRBC # BLD AUTO: 0 K/UL
NRBC BLD AUTO-RTO: 0 /100 WBC
PLATELET # BLD AUTO: 324 K/UL (ref 164–446)
PMV BLD AUTO: 10.1 FL (ref 9–12.9)
POTASSIUM SERPL-SCNC: 4.2 MMOL/L (ref 3.6–5.5)
PROT SERPL-MCNC: 6.5 G/DL (ref 6–8.2)
RBC # BLD AUTO: 4.21 M/UL (ref 4.2–5.4)
SODIUM SERPL-SCNC: 142 MMOL/L (ref 135–145)
TRIGL SERPL-MCNC: 42 MG/DL (ref 0–149)
TSH SERPL DL<=0.005 MIU/L-ACNC: 1.86 UIU/ML (ref 0.3–3.7)
VIT B12 SERPL-MCNC: 632 PG/ML (ref 211–911)
WBC # BLD AUTO: 5.7 K/UL (ref 4.8–10.8)

## 2017-07-13 PROCEDURE — 80053 COMPREHEN METABOLIC PANEL: CPT

## 2017-07-13 PROCEDURE — 36415 COLL VENOUS BLD VENIPUNCTURE: CPT

## 2017-07-13 PROCEDURE — 80061 LIPID PANEL: CPT

## 2017-07-13 PROCEDURE — 82746 ASSAY OF FOLIC ACID SERUM: CPT

## 2017-07-13 PROCEDURE — 82607 VITAMIN B-12: CPT

## 2017-07-13 PROCEDURE — 84443 ASSAY THYROID STIM HORMONE: CPT

## 2017-07-13 PROCEDURE — 84160 ASSAY OF PROTEIN ANY SOURCE: CPT

## 2017-07-13 PROCEDURE — 84165 PROTEIN E-PHORESIS SERUM: CPT

## 2017-07-13 PROCEDURE — 85025 COMPLETE CBC W/AUTO DIFF WBC: CPT

## 2017-07-16 LAB
ALBUMIN SERPL-MCNC: 4.03 G/DL (ref 3.75–5.01)
ALPHA1 GLOB SERPL ELPH-MCNC: 0.29 G/DL (ref 0.19–0.46)
ALPHA2 GLOB SERPL ELPH-MCNC: 0.86 G/DL (ref 0.48–1.05)
B-GLOBULIN SERPL ELPH-MCNC: 0.81 G/DL (ref 0.48–1.1)
EER PROT ELECT SER Q1092: NORMAL
GAMMA GLOB SERPL ELPH-MCNC: 0.71 G/DL (ref 0.62–1.51)
INTERPRETATION SERPL IFE-IMP: NORMAL
PROT SERPL-MCNC: 6.7 G/DL (ref 6–8.3)

## 2017-07-27 ENCOUNTER — NON-PROVIDER VISIT (OUTPATIENT)
Dept: CARDIOLOGY | Facility: MEDICAL CENTER | Age: 72
End: 2017-07-27
Payer: MEDICARE

## 2017-07-27 DIAGNOSIS — I49.1 PREMATURE ATRIAL CONTRACTION: ICD-10-CM

## 2017-07-27 DIAGNOSIS — I49.8 BIGEMINY: ICD-10-CM

## 2017-07-27 DIAGNOSIS — R00.8 TRIGEMINY: ICD-10-CM

## 2017-07-27 DIAGNOSIS — R00.1 SINUS BRADYCARDIA: ICD-10-CM

## 2017-07-27 DIAGNOSIS — I49.3 PVC (PREMATURE VENTRICULAR CONTRACTION): ICD-10-CM

## 2017-07-27 DIAGNOSIS — R00.2 PALPITATIONS: ICD-10-CM

## 2017-08-01 DIAGNOSIS — R00.2 PALPITATIONS: ICD-10-CM

## 2017-08-06 LAB — EKG IMPRESSION: NORMAL

## 2017-08-06 PROCEDURE — 93224 XTRNL ECG REC UP TO 48 HRS: CPT | Performed by: INTERNAL MEDICINE

## 2017-08-08 ENCOUNTER — TELEPHONE (OUTPATIENT)
Dept: CARDIOLOGY | Facility: MEDICAL CENTER | Age: 72
End: 2017-08-08

## 2017-08-08 NOTE — TELEPHONE ENCOUNTER
Pt notified of echo results as reviewed by Dr. Solomon. Pt appreciative of call. No FU scheduled at this time.  -----------------  Final interpretation:   Underlying rhythm is sinus with a minimum heart rate of 52 bpm, average heart   rate of 75 beats on the maximum heart rate of 119 BPM.   Total ventricular ectopic beats 5.5%.   During symptoms underlying rhythm was sinus with ventricle ectopy.   -----------------------------

## 2017-10-02 ENCOUNTER — APPOINTMENT (RX ONLY)
Dept: URBAN - METROPOLITAN AREA CLINIC 4 | Facility: CLINIC | Age: 72
Setting detail: DERMATOLOGY
End: 2017-10-02

## 2017-10-02 DIAGNOSIS — L82.1 OTHER SEBORRHEIC KERATOSIS: ICD-10-CM

## 2017-10-02 DIAGNOSIS — L81.4 OTHER MELANIN HYPERPIGMENTATION: ICD-10-CM

## 2017-10-02 PROCEDURE — 99213 OFFICE O/P EST LOW 20 MIN: CPT

## 2017-10-02 PROCEDURE — ? COUNSELING

## 2017-10-02 ASSESSMENT — LOCATION DETAILED DESCRIPTION DERM
LOCATION DETAILED: RIGHT POSTERIOR SHOULDER
LOCATION DETAILED: LEFT ANTERIOR DISTAL UPPER ARM
LOCATION DETAILED: RIGHT ELBOW
LOCATION DETAILED: INFERIOR THORACIC SPINE
LOCATION DETAILED: LEFT ANTERIOR PROXIMAL THIGH
LOCATION DETAILED: LEFT DISTAL POSTERIOR UPPER ARM
LOCATION DETAILED: LEFT MEDIAL BREAST 8-9:00 REGION
LOCATION DETAILED: RIGHT ANTERIOR DISTAL THIGH
LOCATION DETAILED: LEFT CENTRAL MALAR CHEEK
LOCATION DETAILED: RIGHT INFERIOR POSTERIOR NECK
LOCATION DETAILED: RIGHT ANTERIOR DISTAL UPPER ARM

## 2017-10-02 ASSESSMENT — LOCATION SIMPLE DESCRIPTION DERM
LOCATION SIMPLE: POSTERIOR NECK
LOCATION SIMPLE: RIGHT THIGH
LOCATION SIMPLE: LEFT CHEEK
LOCATION SIMPLE: UPPER BACK
LOCATION SIMPLE: RIGHT UPPER ARM
LOCATION SIMPLE: LEFT UPPER ARM
LOCATION SIMPLE: LEFT POSTERIOR UPPER ARM
LOCATION SIMPLE: RIGHT SHOULDER
LOCATION SIMPLE: LEFT BREAST
LOCATION SIMPLE: LEFT THIGH
LOCATION SIMPLE: RIGHT ELBOW

## 2017-10-02 ASSESSMENT — LOCATION ZONE DERM
LOCATION ZONE: FACE
LOCATION ZONE: NECK
LOCATION ZONE: LEG
LOCATION ZONE: TRUNK
LOCATION ZONE: ARM

## 2017-10-10 ENCOUNTER — APPOINTMENT (OUTPATIENT)
Dept: SOCIAL WORK | Facility: CLINIC | Age: 72
End: 2017-10-10
Payer: MEDICARE

## 2017-10-10 PROCEDURE — 90662 IIV NO PRSV INCREASED AG IM: CPT | Performed by: REGISTERED NURSE

## 2017-10-10 PROCEDURE — G0008 ADMIN INFLUENZA VIRUS VAC: HCPCS | Performed by: REGISTERED NURSE

## 2018-01-17 ENCOUNTER — HOSPITAL ENCOUNTER (OUTPATIENT)
Dept: LAB | Facility: MEDICAL CENTER | Age: 73
End: 2018-01-17
Attending: INTERNAL MEDICINE
Payer: MEDICARE

## 2018-01-17 DIAGNOSIS — E78.5 DYSLIPIDEMIA: ICD-10-CM

## 2018-01-17 LAB
ALBUMIN SERPL BCP-MCNC: 3 G/DL (ref 3.2–4.9)
ALBUMIN/GLOB SERPL: 1 G/DL
ALP SERPL-CCNC: 58 U/L (ref 30–99)
ALT SERPL-CCNC: 27 U/L (ref 2–50)
ANION GAP SERPL CALC-SCNC: 8 MMOL/L (ref 0–11.9)
AST SERPL-CCNC: 28 U/L (ref 12–45)
BILIRUB SERPL-MCNC: 0.6 MG/DL (ref 0.1–1.5)
BUN SERPL-MCNC: 9 MG/DL (ref 8–22)
CALCIUM SERPL-MCNC: 8.7 MG/DL (ref 8.5–10.5)
CHLORIDE SERPL-SCNC: 109 MMOL/L (ref 96–112)
CHOLEST SERPL-MCNC: 144 MG/DL (ref 100–199)
CO2 SERPL-SCNC: 26 MMOL/L (ref 20–33)
CREAT SERPL-MCNC: 0.55 MG/DL (ref 0.5–1.4)
GLOBULIN SER CALC-MCNC: 3.1 G/DL (ref 1.9–3.5)
GLUCOSE SERPL-MCNC: 99 MG/DL (ref 65–99)
HDLC SERPL-MCNC: 70 MG/DL
LDLC SERPL CALC-MCNC: 64 MG/DL
POTASSIUM SERPL-SCNC: 4.4 MMOL/L (ref 3.6–5.5)
PROT SERPL-MCNC: 6.1 G/DL (ref 6–8.2)
SODIUM SERPL-SCNC: 143 MMOL/L (ref 135–145)
TRIGL SERPL-MCNC: 52 MG/DL (ref 0–149)

## 2018-01-17 PROCEDURE — 80053 COMPREHEN METABOLIC PANEL: CPT

## 2018-01-17 PROCEDURE — 80061 LIPID PANEL: CPT

## 2018-01-17 PROCEDURE — 36415 COLL VENOUS BLD VENIPUNCTURE: CPT

## 2018-01-19 NOTE — PROGRESS NOTES
Results for ROSALINO BRADY (MRN [id number]) as of 1/19/2018 12:08   7/13/2017 1/17/2018    Sodium 142 143   Potassium 4.2 4.4   Chloride 106 109   Co2 28 26   Anion Gap 8.0 8.0   Glucose 83 99   Bun 10 9   Creatinine 0.65 0.55   GFR If Non African American >60 >60   Calcium 9.1 8.7   AST(SGOT) 32 28   ALT(SGPT) 37 27   Alkaline Phosphatase 66 58   Cholesterol,Tot 152 144   Triglycerides 42 52   HDL 81 70   LDL 63 64

## 2018-01-22 ENCOUNTER — TELEPHONE (OUTPATIENT)
Dept: CARDIOLOGY | Facility: MEDICAL CENTER | Age: 73
End: 2018-01-22

## 2018-01-22 NOTE — TELEPHONE ENCOUNTER
Pt called with information, no answer,left  for call back.  Nataliya LEBLANC RN     ----- Message from Lor Solomon M.D. sent at 1/19/2018 12:10 PM PST -----  Labs look good  Lor  ----- Message -----  From: Nataliya Weems R.N.  Sent: 1/18/2018   9:57 AM  To: Lor Solomon M.D.    F/V with you 2/1st

## 2018-02-01 ENCOUNTER — OFFICE VISIT (OUTPATIENT)
Dept: CARDIOLOGY | Facility: MEDICAL CENTER | Age: 73
End: 2018-02-01
Payer: MEDICARE

## 2018-02-01 VITALS
WEIGHT: 114 LBS | RESPIRATION RATE: 12 BRPM | OXYGEN SATURATION: 95 % | HEIGHT: 60 IN | BODY MASS INDEX: 22.38 KG/M2 | HEART RATE: 68 BPM | SYSTOLIC BLOOD PRESSURE: 130 MMHG | DIASTOLIC BLOOD PRESSURE: 70 MMHG

## 2018-02-01 DIAGNOSIS — I34.0 NON-RHEUMATIC MITRAL REGURGITATION: ICD-10-CM

## 2018-02-01 DIAGNOSIS — E78.2 MIXED HYPERLIPIDEMIA: ICD-10-CM

## 2018-02-01 DIAGNOSIS — I49.3 PVC (PREMATURE VENTRICULAR CONTRACTION): ICD-10-CM

## 2018-02-01 PROCEDURE — 99213 OFFICE O/P EST LOW 20 MIN: CPT | Performed by: INTERNAL MEDICINE

## 2018-02-01 RX ORDER — GABAPENTIN 100 MG/1
100 CAPSULE ORAL PRN
COMMUNITY
Start: 2018-01-22 | End: 2019-02-05

## 2018-02-01 RX ORDER — DONEPEZIL HYDROCHLORIDE 10 MG/1
10 TABLET, FILM COATED ORAL DAILY
COMMUNITY
Start: 2017-11-09

## 2018-02-01 ASSESSMENT — ENCOUNTER SYMPTOMS
MYALGIAS: 0
ORTHOPNEA: 0
LOSS OF CONSCIOUSNESS: 0
DOUBLE VISION: 0
CLAUDICATION: 0
DEPRESSION: 0
BLOOD IN STOOL: 0
BLURRED VISION: 0
HEADACHES: 0
BRUISES/BLEEDS EASILY: 0
COUGH: 0
PALPITATIONS: 0
NERVOUS/ANXIOUS: 0
ABDOMINAL PAIN: 0
CHILLS: 0
FEVER: 0
DIZZINESS: 0
SHORTNESS OF BREATH: 0
PND: 0

## 2018-02-01 NOTE — PROGRESS NOTES
Subjective:   Estrella Peña is a 71 y.o. female with known h/o  1. Dyslipidemia   Presenting today for follow-up evaluation of irregular heartbeat.    Patient denied any further episodes of irregular heartbeat. Able to tolerate statins without any complaints of myalgias. No complaints of dizziness lightheadedness or LOC. No complaints of lower extremity edema or claudication.  Past Medical History:   Diagnosis Date   • Breath shortness     With exertion   • Bronchitis    • Hyperlipidemia    • Mitral regurgitation    • Unspecified disorder of thyroid 1968    No longer     Past Surgical History:   Procedure Laterality Date   • RHYTIDECTOMY  12/8/2010    Performed by ALCIRA SALCEDO at SURGERY [facility] ORS   • OTHER  2008    urethral repair     Family History   Problem Relation Age of Onset   • Cancer Mother    • Hypertension Sister    • Stroke Sister    • Other Father      Dementia   • Diabetes     • Cancer       History   Smoking Status   • Former Smoker   • Packs/day: 1.00   • Years: 1.00   • Types: Cigarettes   • Quit date: 1/1/1965   Smokeless Tobacco   • Never Used     Allergies   Allergen Reactions   • Nkda [No Known Drug Allergy]      Outpatient Encounter Prescriptions as of 2/1/2018   Medication Sig Dispense Refill   • donepezil (ARICEPT) 10 MG tablet Take 10 mg by mouth every day.     • gabapentin (NEURONTIN) 100 MG Cap Take 100 mg by mouth as needed.     • Potassium 99 MG Tab Take 1 Tab by mouth every day.     • ezetimibe (ZETIA) 10 MG Tab Take 1 Tab by mouth every day. 30 Tab 11   • atorvastatin (LIPITOR) 10 MG Tab Take 10 mg by mouth every evening.     • DONEPEZIL HYDROCHLORIDE 10 MG TABLET DISPERSIBLE Take 1 Tab by mouth every day.     • aspirin EC (ECOTRIN) 81 MG Tablet Delayed Response Take 81 mg by mouth every 72 hours.     • omeprazole (PRILOSEC) 20 MG delayed-release capsule Take 20 mg by mouth every day.     • therapeutic multivitamin-minerals (THERAGRAN-M) Tab Take 1 Tab by mouth every day.      • alendronate (FOSAMAX) 70 MG Tab Take 70 mg by mouth every 7 days. On Tue     • cyanocobalamin (VITAMIN B-12) 500 MCG Tab Take 500 mcg by mouth every day.     • Red Yeast Rice Extract (RED YEAST RICE PO) Take 2 Caps by mouth every day.       No facility-administered encounter medications on file as of 2/1/2018.      Review of Systems   Constitutional: Negative for chills and fever.   HENT: Negative for hearing loss.    Eyes: Negative for blurred vision and double vision.   Respiratory: Negative for cough and shortness of breath.    Cardiovascular: Negative for chest pain, palpitations, orthopnea, claudication, leg swelling and PND.   Gastrointestinal: Negative for abdominal pain, blood in stool and melena.   Genitourinary: Negative for dysuria.   Musculoskeletal: Negative for joint pain and myalgias.   Skin: Negative for rash.   Neurological: Negative for dizziness, loss of consciousness and headaches.   Endo/Heme/Allergies: Does not bruise/bleed easily.   Psychiatric/Behavioral: Negative for depression. The patient is not nervous/anxious.         Objective:   /70   Pulse 68   Resp 12   Ht 1.524 m (5')   Wt 51.7 kg (114 lb)   SpO2 95%   BMI 22.26 kg/m²     Physical Exam   Constitutional: She is oriented to person, place, and time. She appears well-developed and well-nourished.   HENT:   Head: Normocephalic and atraumatic.   Eyes: Pupils are equal, round, and reactive to light. Right eye exhibits no discharge. Left eye exhibits no discharge.   Neck: No JVD present. No tracheal deviation present.   Cardiovascular: Normal rate.  A regularly irregular rhythm present.   Murmur heard.  Pulses:       Carotid pulses are 2+ on the right side, and 2+ on the left side.       Radial pulses are 2+ on the right side, and 2+ on the left side.        Posterior tibial pulses are 2+ on the right side, and 2+ on the left side.   Systolic murmur best heard in the mitral area   Pulmonary/Chest: Breath sounds normal. No  respiratory distress. She has no wheezes. She has no rales.   Abdominal: Soft. Bowel sounds are normal. She exhibits no distension. There is no tenderness. There is no rebound and no guarding.   Musculoskeletal: Normal range of motion. She exhibits no edema.   Neurological: She is alert and oriented to person, place, and time. No cranial nerve deficit.   Skin: Skin is warm and dry. She is not diaphoretic. No erythema.   Psychiatric: She has a normal mood and affect.   Holter Monitor: 17  Underlying rhythm is sinus with a minimum heart rate of 52 bpm, average heart   rate of 75 beats on the maximum heart rate of 119 BPM.   Total ventricular ectopic beats 5.5%.   During symptoms underlying rhythm was sinus with ventricle ectopy    Results for ROSALINO BRADY (MRN [id number]) as of 2018 12:01   2017    Sodium 142 143   Potassium 4.2 4.4   Chloride 106 109   Co2 28 26   Anion Gap 8.0 8.0   Glucose 83 99   Bun 10 9   Creatinine 0.65 0.55   GFR If Non African American >60 >60   Calcium 9.1 8.7   AST(SGOT) 32 28   ALT(SGPT) 37 27   Alkaline Phosphatase 66 58   Cholesterol,Tot 152 144   Triglycerides 42 52   HDL 81 70   LDL 63 64     EK17  EKG personally reviewed by me.   Sinus rhythm 60 bpm, less than 0.5 mm ST depression in inferolateral leads. Unchanged from prior EKG.    TTE: 10/14/16  No prior study is available for comparison.   Normal left ventricular size, wall thickness, and systolic function.  Left ventricular ejection fraction is visually estimated to be 60%. Grade I diastolic dysfunction.  The right ventricle was normal in size and function.  Mild mitral regurgitation.  Mild tricuspid regurgitation. Estimated right ventricular systolic pressure  is 25 mmHg.  Mild pulmonic insufficiency.  Normal pericardium without effusion    Assessment:     1. Dyslipidemia  2. Mitral regurgitation  3. Asymptomatic PVCs  Medical Decision Making:  Today's Assessment / Status / Plan:     1. Continue  Lipitor 10 mg qHs.  Continue Zetia 10 mg daily.  LDL less than 60.  2. Repeat echocardiogram in 3 years (i.e 2021 assess severity of MR).  3. No further workup needed since patient is asymptomatic.    Follow-up PRN    Thank you for allowing me to participate in taking care of patient.    Lor Jenkins MD.

## 2018-02-01 NOTE — LETTER
[facility]   [address]  [place] [address]  Phone: [phone]  Fax: [phone]              Estrella Peña  1945    Encounter Date: 2/1/2018    Lor Solomon M.D.          PROGRESS NOTE:  Subjective:   Estrella Peña is a 71 y.o. female with known h/o  1. Dyslipidemia   Presenting today for follow-up evaluation of irregular heartbeat.    Patient denied any further episodes of irregular heartbeat. Able to tolerate statins without any complaints of myalgias. No complaints of dizziness lightheadedness or LOC. No complaints of lower extremity edema or claudication.  Past Medical History:   Diagnosis Date   • Breath shortness     With exertion   • Bronchitis    • Hyperlipidemia    • Mitral regurgitation    • Unspecified disorder of thyroid 1968    No longer     Past Surgical History:   Procedure Laterality Date   • RHYTIDECTOMY  12/8/2010    Performed by ALCIRA SALCEDO at [facility] ORS   • OTHER  2008    urethral repair     Family History   Problem Relation Age of Onset   • Cancer Mother    • Hypertension Sister    • Stroke Sister    • Other Father      Dementia   • Diabetes     • Cancer       History   Smoking Status   • Former Smoker   • Packs/day: 1.00   • Years: 1.00   • Types: Cigarettes   • Quit date: 1/1/1965   Smokeless Tobacco   • Never Used     Allergies   Allergen Reactions   • Nkda [No Known Drug Allergy]      Outpatient Encounter Prescriptions as of 2/1/2018   Medication Sig Dispense Refill   • donepezil (ARICEPT) 10 MG tablet Take 10 mg by mouth every day.     • gabapentin (NEURONTIN) 100 MG Cap Take 100 mg by mouth as needed.     • Potassium 99 MG Tab Take 1 Tab by mouth every day.     • ezetimibe (ZETIA) 10 MG Tab Take 1 Tab by mouth every day. 30 Tab 11   • atorvastatin (LIPITOR) 10 MG Tab Take 10 mg by mouth every evening.     • DONEPEZIL HYDROCHLORIDE 10 MG TABLET DISPERSIBLE Take 1 Tab by mouth every day.     • aspirin  EC (ECOTRIN) 81 MG Tablet Delayed Response Take 81 mg by mouth every 72 hours.     • omeprazole (PRILOSEC) 20 MG delayed-release capsule Take 20 mg by mouth every day.     • therapeutic multivitamin-minerals (THERAGRAN-M) Tab Take 1 Tab by mouth every day.     • alendronate (FOSAMAX) 70 MG Tab Take 70 mg by mouth every 7 days. On Tue     • cyanocobalamin (VITAMIN B-12) 500 MCG Tab Take 500 mcg by mouth every day.     • Red Yeast Rice Extract (RED YEAST RICE PO) Take 2 Caps by mouth every day.       No facility-administered encounter medications on file as of 2/1/2018.      Review of Systems   Constitutional: Negative for chills and fever.   HENT: Negative for hearing loss.    Eyes: Negative for blurred vision and double vision.   Respiratory: Negative for cough and shortness of breath.    Cardiovascular: Negative for chest pain, palpitations, orthopnea, claudication, leg swelling and PND.   Gastrointestinal: Negative for abdominal pain, blood in stool and melena.   Genitourinary: Negative for dysuria.   Musculoskeletal: Negative for joint pain and myalgias.   Skin: Negative for rash.   Neurological: Negative for dizziness, loss of consciousness and headaches.   Endo/Heme/Allergies: Does not bruise/bleed easily.   Psychiatric/Behavioral: Negative for depression. The patient is not nervous/anxious.         Objective:   /70   Pulse 68   Resp 12   Ht 1.524 m (5')   Wt 51.7 kg (114 lb)   SpO2 95%   BMI 22.26 kg/m²      Physical Exam   Constitutional: She is oriented to person, place, and time. She appears well-developed and well-nourished.   HENT:   Head: Normocephalic and atraumatic.   Eyes: Pupils are equal, round, and reactive to light. Right eye exhibits no discharge. Left eye exhibits no discharge.   Neck: No JVD present. No tracheal deviation present.   Cardiovascular: Normal rate.  A regularly irregular rhythm present.   Murmur heard.  Pulses:       Carotid pulses are 2+ on the right side, and 2+ on  the left side.       Radial pulses are 2+ on the right side, and 2+ on the left side.        Posterior tibial pulses are 2+ on the right side, and 2+ on the left side.   Systolic murmur best heard in the mitral area   Pulmonary/Chest: Breath sounds normal. No respiratory distress. She has no wheezes. She has no rales.   Abdominal: Soft. Bowel sounds are normal. She exhibits no distension. There is no tenderness. There is no rebound and no guarding.   Musculoskeletal: Normal range of motion. She exhibits no edema.   Neurological: She is alert and oriented to person, place, and time. No cranial nerve deficit.   Skin: Skin is warm and dry. She is not diaphoretic. No erythema.   Psychiatric: She has a normal mood and affect.   Holter Monitor: 17  Underlying rhythm is sinus with a minimum heart rate of 52 bpm, average heart   rate of 75 beats on the maximum heart rate of 119 BPM.   Total ventricular ectopic beats 5.5%.   During symptoms underlying rhythm was sinus with ventricle ectopy    Results for JOSE ANTONIO ROSALINOLIZ GONZALES (MRN [id number]) as of 2018 12:01   2017    Sodium 142 143   Potassium 4.2 4.4   Chloride 106 109   Co2 28 26   Anion Gap 8.0 8.0   Glucose 83 99   Bun 10 9   Creatinine 0.65 0.55   GFR If Non African American >60 >60   Calcium 9.1 8.7   AST(SGOT) 32 28   ALT(SGPT) 37 27   Alkaline Phosphatase 66 58   Cholesterol,Tot 152 144   Triglycerides 42 52   HDL 81 70   LDL 63 64     EK17  EKG personally reviewed by me.   Sinus rhythm 60 bpm, less than 0.5 mm ST depression in inferolateral leads. Unchanged from prior EKG.    TTE: 10/14/16  No prior study is available for comparison.   Normal left ventricular size, wall thickness, and systolic function.  Left ventricular ejection fraction is visually estimated to be 60%. Grade I diastolic dysfunction.  The right ventricle was normal in size and function.  Mild mitral regurgitation.  Mild tricuspid regurgitation. Estimated right ventricular  systolic pressure  is 25 mmHg.  Mild pulmonic insufficiency.  Normal pericardium without effusion    Assessment:     1. Dyslipidemia  2. Mitral regurgitation  3. Asymptomatic PVCs  Medical Decision Making:  Today's Assessment / Status / Plan:     1. Continue Lipitor 10 mg qHs.  Continue Zetia 10 mg daily.  LDL less than 60.  2. Repeat echocardiogram in 3 years (i.e 2021 assess severity of MR).  3. No further workup needed since patient is asymptomatic.    Follow-up PRN    Thank you for allowing me to participate in taking care of patient.    Lor Solomon. MD.          Anne Beltrán M.D.  [address] [address]  VIA Facsimile: [phone]

## 2018-02-27 ENCOUNTER — HOSPITAL ENCOUNTER (OUTPATIENT)
Dept: RADIOLOGY | Facility: MEDICAL CENTER | Age: 73
End: 2018-02-27
Attending: FAMILY MEDICINE
Payer: MEDICARE

## 2018-02-27 DIAGNOSIS — M81.0 POSTMENOPAUSAL OSTEOPOROSIS: ICD-10-CM

## 2018-02-27 PROCEDURE — 77080 DXA BONE DENSITY AXIAL: CPT

## 2018-03-27 ENCOUNTER — HOSPITAL ENCOUNTER (OUTPATIENT)
Dept: LAB | Facility: MEDICAL CENTER | Age: 73
End: 2018-03-27
Attending: FAMILY MEDICINE
Payer: MEDICARE

## 2018-03-27 LAB
ALBUMIN SERPL BCP-MCNC: 4.1 G/DL (ref 3.2–4.9)
ALBUMIN/GLOB SERPL: 1.6 G/DL
ALP SERPL-CCNC: 59 U/L (ref 30–99)
ALT SERPL-CCNC: 24 U/L (ref 2–50)
ANION GAP SERPL CALC-SCNC: 6 MMOL/L (ref 0–11.9)
AST SERPL-CCNC: 25 U/L (ref 12–45)
BASOPHILS # BLD AUTO: 0.6 % (ref 0–1.8)
BASOPHILS # BLD: 0.05 K/UL (ref 0–0.12)
BILIRUB SERPL-MCNC: 0.6 MG/DL (ref 0.1–1.5)
BUN SERPL-MCNC: 11 MG/DL (ref 8–22)
CALCIUM SERPL-MCNC: 9 MG/DL (ref 8.5–10.5)
CHLORIDE SERPL-SCNC: 105 MMOL/L (ref 96–112)
CHOLEST SERPL-MCNC: 191 MG/DL (ref 100–199)
CO2 SERPL-SCNC: 29 MMOL/L (ref 20–33)
CREAT SERPL-MCNC: 0.65 MG/DL (ref 0.5–1.4)
CREAT UR-MCNC: 131.3 MG/DL
EOSINOPHIL # BLD AUTO: 0.01 K/UL (ref 0–0.51)
EOSINOPHIL NFR BLD: 0.1 % (ref 0–6.9)
ERYTHROCYTE [DISTWIDTH] IN BLOOD BY AUTOMATED COUNT: 52.8 FL (ref 35.9–50)
EST. AVERAGE GLUCOSE BLD GHB EST-MCNC: 120 MG/DL
GLOBULIN SER CALC-MCNC: 2.6 G/DL (ref 1.9–3.5)
GLUCOSE SERPL-MCNC: 80 MG/DL (ref 65–99)
HBA1C MFR BLD: 5.8 % (ref 0–5.6)
HCT VFR BLD AUTO: 45.3 % (ref 37–47)
HDLC SERPL-MCNC: 88 MG/DL
HGB BLD-MCNC: 14.2 G/DL (ref 12–16)
IMM GRANULOCYTES # BLD AUTO: 0.01 K/UL (ref 0–0.11)
IMM GRANULOCYTES NFR BLD AUTO: 0.1 % (ref 0–0.9)
LDLC SERPL CALC-MCNC: 85 MG/DL
LYMPHOCYTES # BLD AUTO: 3.42 K/UL (ref 1–4.8)
LYMPHOCYTES NFR BLD: 43.8 % (ref 22–41)
MCH RBC QN AUTO: 32.2 PG (ref 27–33)
MCHC RBC AUTO-ENTMCNC: 31.3 G/DL (ref 33.6–35)
MCV RBC AUTO: 102.7 FL (ref 81.4–97.8)
MICROALBUMIN UR-MCNC: 0.7 MG/DL
MICROALBUMIN/CREAT UR: 5 MG/G (ref 0–30)
MONOCYTES # BLD AUTO: 0.79 K/UL (ref 0–0.85)
MONOCYTES NFR BLD AUTO: 10.1 % (ref 0–13.4)
NEUTROPHILS # BLD AUTO: 3.53 K/UL (ref 2–7.15)
NEUTROPHILS NFR BLD: 45.3 % (ref 44–72)
NRBC # BLD AUTO: 0 K/UL
NRBC BLD-RTO: 0 /100 WBC
PLATELET # BLD AUTO: 242 K/UL (ref 164–446)
PMV BLD AUTO: 11 FL (ref 9–12.9)
POTASSIUM SERPL-SCNC: 3.9 MMOL/L (ref 3.6–5.5)
PROT SERPL-MCNC: 6.7 G/DL (ref 6–8.2)
RBC # BLD AUTO: 4.41 M/UL (ref 4.2–5.4)
SODIUM SERPL-SCNC: 140 MMOL/L (ref 135–145)
T4 FREE SERPL-MCNC: 1.06 NG/DL (ref 0.53–1.43)
TRIGL SERPL-MCNC: 90 MG/DL (ref 0–149)
TSH SERPL DL<=0.005 MIU/L-ACNC: 2.71 UIU/ML (ref 0.38–5.33)
WBC # BLD AUTO: 7.8 K/UL (ref 4.8–10.8)

## 2018-03-27 PROCEDURE — 82570 ASSAY OF URINE CREATININE: CPT

## 2018-03-27 PROCEDURE — 36415 COLL VENOUS BLD VENIPUNCTURE: CPT

## 2018-03-27 PROCEDURE — 83036 HEMOGLOBIN GLYCOSYLATED A1C: CPT

## 2018-03-27 PROCEDURE — 84443 ASSAY THYROID STIM HORMONE: CPT

## 2018-03-27 PROCEDURE — 85025 COMPLETE CBC W/AUTO DIFF WBC: CPT

## 2018-03-27 PROCEDURE — 84439 ASSAY OF FREE THYROXINE: CPT

## 2018-03-27 PROCEDURE — 82043 UR ALBUMIN QUANTITATIVE: CPT

## 2018-03-27 PROCEDURE — 80061 LIPID PANEL: CPT

## 2018-03-27 PROCEDURE — 80053 COMPREHEN METABOLIC PANEL: CPT

## 2018-05-15 ENCOUNTER — HOSPITAL ENCOUNTER (OUTPATIENT)
Dept: RADIOLOGY | Facility: MEDICAL CENTER | Age: 73
End: 2018-05-15
Attending: FAMILY MEDICINE
Payer: MEDICARE

## 2018-05-15 DIAGNOSIS — Z12.31 ENCOUNTER FOR SCREENING MAMMOGRAM FOR MALIGNANT NEOPLASM OF BREAST: ICD-10-CM

## 2018-05-15 PROCEDURE — 77063 BREAST TOMOSYNTHESIS BI: CPT

## 2018-10-18 ENCOUNTER — HOSPITAL ENCOUNTER (OUTPATIENT)
Dept: LAB | Facility: MEDICAL CENTER | Age: 73
End: 2018-10-18
Attending: FAMILY MEDICINE
Payer: MEDICARE

## 2018-10-18 LAB
25(OH)D3 SERPL-MCNC: 26 NG/ML (ref 30–100)
ALBUMIN SERPL BCP-MCNC: 4.3 G/DL (ref 3.2–4.9)
ALBUMIN/GLOB SERPL: 1.7 G/DL
ALP SERPL-CCNC: 54 U/L (ref 30–99)
ALT SERPL-CCNC: 27 U/L (ref 2–50)
ANION GAP SERPL CALC-SCNC: 8 MMOL/L (ref 0–11.9)
AST SERPL-CCNC: 28 U/L (ref 12–45)
BASOPHILS # BLD AUTO: 0.6 % (ref 0–1.8)
BASOPHILS # BLD: 0.04 K/UL (ref 0–0.12)
BILIRUB SERPL-MCNC: 0.6 MG/DL (ref 0.1–1.5)
BUN SERPL-MCNC: 13 MG/DL (ref 8–22)
CALCIUM SERPL-MCNC: 9.7 MG/DL (ref 8.5–10.5)
CHLORIDE SERPL-SCNC: 105 MMOL/L (ref 96–112)
CHOLEST SERPL-MCNC: 174 MG/DL (ref 100–199)
CO2 SERPL-SCNC: 28 MMOL/L (ref 20–33)
CREAT SERPL-MCNC: 0.69 MG/DL (ref 0.5–1.4)
EOSINOPHIL # BLD AUTO: 0.01 K/UL (ref 0–0.51)
EOSINOPHIL NFR BLD: 0.2 % (ref 0–6.9)
ERYTHROCYTE [DISTWIDTH] IN BLOOD BY AUTOMATED COUNT: 48.7 FL (ref 35.9–50)
GLOBULIN SER CALC-MCNC: 2.6 G/DL (ref 1.9–3.5)
GLUCOSE SERPL-MCNC: 78 MG/DL (ref 65–99)
HCT VFR BLD AUTO: 44.1 % (ref 37–47)
HDLC SERPL-MCNC: 83 MG/DL
HGB BLD-MCNC: 14.2 G/DL (ref 12–16)
IMM GRANULOCYTES # BLD AUTO: 0.01 K/UL (ref 0–0.11)
IMM GRANULOCYTES NFR BLD AUTO: 0.2 % (ref 0–0.9)
LDLC SERPL CALC-MCNC: 75 MG/DL
LYMPHOCYTES # BLD AUTO: 2.85 K/UL (ref 1–4.8)
LYMPHOCYTES NFR BLD: 44.4 % (ref 22–41)
MCH RBC QN AUTO: 33.2 PG (ref 27–33)
MCHC RBC AUTO-ENTMCNC: 32.2 G/DL (ref 33.6–35)
MCV RBC AUTO: 103 FL (ref 81.4–97.8)
MONOCYTES # BLD AUTO: 0.73 K/UL (ref 0–0.85)
MONOCYTES NFR BLD AUTO: 11.4 % (ref 0–13.4)
NEUTROPHILS # BLD AUTO: 2.78 K/UL (ref 2–7.15)
NEUTROPHILS NFR BLD: 43.2 % (ref 44–72)
NRBC # BLD AUTO: 0 K/UL
NRBC BLD-RTO: 0 /100 WBC
PLATELET # BLD AUTO: 290 K/UL (ref 164–446)
PMV BLD AUTO: 10.3 FL (ref 9–12.9)
POTASSIUM SERPL-SCNC: 3.7 MMOL/L (ref 3.6–5.5)
PROT SERPL-MCNC: 6.9 G/DL (ref 6–8.2)
RBC # BLD AUTO: 4.28 M/UL (ref 4.2–5.4)
SODIUM SERPL-SCNC: 141 MMOL/L (ref 135–145)
TRIGL SERPL-MCNC: 80 MG/DL (ref 0–149)
TSH SERPL DL<=0.005 MIU/L-ACNC: 3.16 UIU/ML (ref 0.38–5.33)
VIT B12 SERPL-MCNC: 860 PG/ML (ref 211–911)
WBC # BLD AUTO: 6.4 K/UL (ref 4.8–10.8)

## 2018-10-18 PROCEDURE — 84443 ASSAY THYROID STIM HORMONE: CPT

## 2018-10-18 PROCEDURE — 82607 VITAMIN B-12: CPT

## 2018-10-18 PROCEDURE — 85025 COMPLETE CBC W/AUTO DIFF WBC: CPT

## 2018-10-18 PROCEDURE — 36415 COLL VENOUS BLD VENIPUNCTURE: CPT

## 2018-10-18 PROCEDURE — 80061 LIPID PANEL: CPT

## 2018-10-18 PROCEDURE — 80053 COMPREHEN METABOLIC PANEL: CPT

## 2018-10-18 PROCEDURE — 82746 ASSAY OF FOLIC ACID SERUM: CPT

## 2018-10-18 PROCEDURE — 82306 VITAMIN D 25 HYDROXY: CPT

## 2018-10-19 LAB — FOLATE SERPL-MCNC: >24 NG/ML

## 2018-11-13 ENCOUNTER — HOSPITAL ENCOUNTER (OUTPATIENT)
Dept: RADIOLOGY | Facility: MEDICAL CENTER | Age: 73
End: 2018-11-13
Attending: SPECIALIST
Payer: MEDICARE

## 2018-11-13 DIAGNOSIS — H90.3 SENSORY HEARING LOSS, BILATERAL: ICD-10-CM

## 2018-11-13 DIAGNOSIS — H93.13 TINNITUS OF BOTH EARS: ICD-10-CM

## 2018-11-13 PROCEDURE — A9585 GADOBUTROL INJECTION: HCPCS | Performed by: SPECIALIST

## 2018-11-13 PROCEDURE — 700117 HCHG RX CONTRAST REV CODE 255: Performed by: SPECIALIST

## 2018-11-13 PROCEDURE — 70553 MRI BRAIN STEM W/O & W/DYE: CPT

## 2018-11-13 RX ORDER — GADOBUTROL 604.72 MG/ML
6 INJECTION INTRAVENOUS ONCE
Status: COMPLETED | OUTPATIENT
Start: 2018-11-13 | End: 2018-11-13

## 2018-11-13 RX ADMIN — GADOBUTROL 6 ML: 604.72 INJECTION INTRAVENOUS at 11:28

## 2018-12-28 ENCOUNTER — HOSPITAL ENCOUNTER (OUTPATIENT)
Dept: LAB | Facility: MEDICAL CENTER | Age: 73
End: 2018-12-28
Attending: FAMILY MEDICINE
Payer: MEDICARE

## 2018-12-28 LAB
25(OH)D3 SERPL-MCNC: 45 NG/ML (ref 30–100)
ALBUMIN SERPL BCP-MCNC: 4.2 G/DL (ref 3.2–4.9)
ALBUMIN/GLOB SERPL: 1.7 G/DL
ALP SERPL-CCNC: 53 U/L (ref 30–99)
ALT SERPL-CCNC: 23 U/L (ref 2–50)
ANION GAP SERPL CALC-SCNC: 8 MMOL/L (ref 0–11.9)
APPEARANCE UR: CLEAR
AST SERPL-CCNC: 27 U/L (ref 12–45)
BACTERIA #/AREA URNS HPF: NEGATIVE /HPF
BASOPHILS # BLD AUTO: 0.6 % (ref 0–1.8)
BASOPHILS # BLD: 0.04 K/UL (ref 0–0.12)
BILIRUB SERPL-MCNC: 0.6 MG/DL (ref 0.1–1.5)
BILIRUB UR QL STRIP.AUTO: NEGATIVE
BUN SERPL-MCNC: 15 MG/DL (ref 8–22)
CALCIUM SERPL-MCNC: 9.2 MG/DL (ref 8.5–10.5)
CHLORIDE SERPL-SCNC: 104 MMOL/L (ref 96–112)
CHOLEST SERPL-MCNC: 168 MG/DL (ref 100–199)
CO2 SERPL-SCNC: 28 MMOL/L (ref 20–33)
COLOR UR: YELLOW
CREAT SERPL-MCNC: 0.7 MG/DL (ref 0.5–1.4)
EOSINOPHIL # BLD AUTO: 0.02 K/UL (ref 0–0.51)
EOSINOPHIL NFR BLD: 0.3 % (ref 0–6.9)
EPI CELLS #/AREA URNS HPF: NEGATIVE /HPF
ERYTHROCYTE [DISTWIDTH] IN BLOOD BY AUTOMATED COUNT: 46.4 FL (ref 35.9–50)
GLOBULIN SER CALC-MCNC: 2.5 G/DL (ref 1.9–3.5)
GLUCOSE SERPL-MCNC: 90 MG/DL (ref 65–99)
GLUCOSE UR STRIP.AUTO-MCNC: NEGATIVE MG/DL
HCT VFR BLD AUTO: 44.6 % (ref 37–47)
HDLC SERPL-MCNC: 83 MG/DL
HGB BLD-MCNC: 14.5 G/DL (ref 12–16)
HYALINE CASTS #/AREA URNS LPF: ABNORMAL /LPF
IMM GRANULOCYTES # BLD AUTO: 0.01 K/UL (ref 0–0.11)
IMM GRANULOCYTES NFR BLD AUTO: 0.2 % (ref 0–0.9)
KETONES UR STRIP.AUTO-MCNC: NEGATIVE MG/DL
LDLC SERPL CALC-MCNC: 72 MG/DL
LEUKOCYTE ESTERASE UR QL STRIP.AUTO: NEGATIVE
LYMPHOCYTES # BLD AUTO: 2.4 K/UL (ref 1–4.8)
LYMPHOCYTES NFR BLD: 37 % (ref 22–41)
MCH RBC QN AUTO: 32.4 PG (ref 27–33)
MCHC RBC AUTO-ENTMCNC: 32.5 G/DL (ref 33.6–35)
MCV RBC AUTO: 99.8 FL (ref 81.4–97.8)
MICRO URNS: ABNORMAL
MONOCYTES # BLD AUTO: 0.85 K/UL (ref 0–0.85)
MONOCYTES NFR BLD AUTO: 13.1 % (ref 0–13.4)
NEUTROPHILS # BLD AUTO: 3.17 K/UL (ref 2–7.15)
NEUTROPHILS NFR BLD: 48.8 % (ref 44–72)
NITRITE UR QL STRIP.AUTO: NEGATIVE
NRBC # BLD AUTO: 0 K/UL
NRBC BLD-RTO: 0 /100 WBC
PH UR STRIP.AUTO: 7.5 [PH]
PLATELET # BLD AUTO: 312 K/UL (ref 164–446)
PMV BLD AUTO: 10.3 FL (ref 9–12.9)
POTASSIUM SERPL-SCNC: 4.3 MMOL/L (ref 3.6–5.5)
PROT SERPL-MCNC: 6.7 G/DL (ref 6–8.2)
PROT UR QL STRIP: NEGATIVE MG/DL
RBC # BLD AUTO: 4.47 M/UL (ref 4.2–5.4)
RBC # URNS HPF: ABNORMAL /HPF
RBC UR QL AUTO: ABNORMAL
SODIUM SERPL-SCNC: 140 MMOL/L (ref 135–145)
SP GR UR STRIP.AUTO: 1.02
TRIGL SERPL-MCNC: 65 MG/DL (ref 0–149)
TSH SERPL DL<=0.005 MIU/L-ACNC: 2.55 UIU/ML (ref 0.38–5.33)
UROBILINOGEN UR STRIP.AUTO-MCNC: 0.2 MG/DL
WBC # BLD AUTO: 6.5 K/UL (ref 4.8–10.8)
WBC #/AREA URNS HPF: ABNORMAL /HPF

## 2018-12-28 PROCEDURE — 81001 URINALYSIS AUTO W/SCOPE: CPT

## 2018-12-28 PROCEDURE — 84443 ASSAY THYROID STIM HORMONE: CPT

## 2018-12-28 PROCEDURE — 80061 LIPID PANEL: CPT

## 2018-12-28 PROCEDURE — 83036 HEMOGLOBIN GLYCOSYLATED A1C: CPT

## 2018-12-28 PROCEDURE — 85025 COMPLETE CBC W/AUTO DIFF WBC: CPT

## 2018-12-28 PROCEDURE — 80053 COMPREHEN METABOLIC PANEL: CPT

## 2018-12-28 PROCEDURE — 36415 COLL VENOUS BLD VENIPUNCTURE: CPT

## 2018-12-28 PROCEDURE — 82306 VITAMIN D 25 HYDROXY: CPT

## 2018-12-29 LAB
EST. AVERAGE GLUCOSE BLD GHB EST-MCNC: 126 MG/DL
HBA1C MFR BLD: 6 % (ref 0–5.6)

## 2019-01-09 ENCOUNTER — OFFICE VISIT (OUTPATIENT)
Dept: URGENT CARE | Facility: CLINIC | Age: 74
End: 2019-01-09
Payer: MEDICARE

## 2019-01-09 VITALS
BODY MASS INDEX: 22.26 KG/M2 | HEART RATE: 76 BPM | WEIGHT: 114 LBS | SYSTOLIC BLOOD PRESSURE: 108 MMHG | RESPIRATION RATE: 14 BRPM | TEMPERATURE: 98.7 F | OXYGEN SATURATION: 95 % | DIASTOLIC BLOOD PRESSURE: 62 MMHG

## 2019-01-09 DIAGNOSIS — Z23 NEED FOR TDAP VACCINATION: ICD-10-CM

## 2019-01-09 DIAGNOSIS — S61.213A LACERATION OF LEFT MIDDLE FINGER WITHOUT FOREIGN BODY WITHOUT DAMAGE TO NAIL, INITIAL ENCOUNTER: ICD-10-CM

## 2019-01-09 PROCEDURE — 90715 TDAP VACCINE 7 YRS/> IM: CPT | Performed by: NURSE PRACTITIONER

## 2019-01-09 PROCEDURE — 99203 OFFICE O/P NEW LOW 30 MIN: CPT | Mod: 25 | Performed by: NURSE PRACTITIONER

## 2019-01-09 PROCEDURE — 90471 IMMUNIZATION ADMIN: CPT | Performed by: NURSE PRACTITIONER

## 2019-01-09 ASSESSMENT — ENCOUNTER SYMPTOMS
BRUISES/BLEEDS EASILY: 0
FEVER: 0
CHILLS: 0

## 2019-01-09 ASSESSMENT — LIFESTYLE VARIABLES: SUBSTANCE_ABUSE: 0

## 2019-01-09 NOTE — PROGRESS NOTES
Subjective:      Estrella Peña is a 73 y.o. female who presents with Laceration ((L) middle finger x 1 day)        Denies past medical, surgical or family history that is significant to today's problem.      Negative for DM.   RX or OTC medications-- reviewed with patient today.   Allergies   Allergen Reactions   • Nkda [No Known Drug Allergy]          HPI this is a new problem.  Is a 73-year-old female who presents with a laceration to her left middle finger.  The injury occurred last night about 8:30 in the evening.  She was using a knife from her kitchen and was cutting a vegetable into her hand the night slipped and cut her finger.  She washed it with soap and water applied pressure and a bandage on it.  It continued to bleed this morning when she got up and her spouse wanted her to come into urgent care to be seen.  Her Tdap is unknown to her possibly greater than 10 years.  She has certain that she has had not had it in the last 5 years.  Her finger bleeds if she bends the skin too much.  It is tender to touch.  It was previously throbbing but is now stopped.  She has taken some OTC ibuprofen.  No other aggravating or alleviating factors.    Review of Systems   Constitutional: Negative for chills and fever.   Musculoskeletal: Negative for joint pain.   Endo/Heme/Allergies: Does not bruise/bleed easily.   Psychiatric/Behavioral: Negative for substance abuse.          Objective:     /62 (BP Location: Left arm, Patient Position: Sitting, BP Cuff Size: Adult)   Pulse 76   Temp 37.1 °C (98.7 °F) (Temporal)   Resp 14   Wt 51.7 kg (114 lb)   SpO2 95%   BMI 22.26 kg/m²      Physical Exam   Constitutional: She is oriented to person, place, and time. She appears well-developed and well-nourished.   Cardiovascular: Normal rate and intact distal pulses.    Pulmonary/Chest: Effort normal.   Neurological: She is alert and oriented to person, place, and time.   Skin: Skin is warm. Capillary refill takes less  than 2 seconds. Laceration (Superficial laceration to the left middle finger.  Hemostasis is present.  Brisk capillary refill.  Wound edges have started to approximate mild erythema.  Mildly tender to palpation..) noted. No ecchymosis noted.   Psychiatric: She has a normal mood and affect. Her behavior is normal.   Nursing note and vitals reviewed.        tdap given in urgent care today. Tolerated well. No adverse reactions.      Assessment/Plan:     1. Laceration of left middle finger without foreign body without damage to nail, initial encounter    - Tdap =>8yo IM    2. Need for Tdap vaccination    - Tdap =>8yo IM     Due To the timeframe of her injury this is not a suturable wound at this time.  Hemostasis is present and edges of wound are starting to approximate well.  We will treat conservatively with dressings.  Patient is agreement to this treatment plan.   The patient is alerted to watch for any signs of infection (redness, pus, pain, increased swelling or fever) and call if such occurs. Home wound care instructions are provided. Tetanus vaccination status reviewed: Td vaccination indicated and given today.

## 2019-01-17 ENCOUNTER — HOSPITAL ENCOUNTER (OUTPATIENT)
Dept: CARDIOLOGY | Facility: MEDICAL CENTER | Age: 74
End: 2019-01-17
Attending: FAMILY MEDICINE
Payer: MEDICARE

## 2019-01-17 ENCOUNTER — HOSPITAL ENCOUNTER (OUTPATIENT)
Dept: RADIOLOGY | Facility: MEDICAL CENTER | Age: 74
End: 2019-01-17
Attending: FAMILY MEDICINE
Payer: MEDICARE

## 2019-01-17 DIAGNOSIS — I49.3 VENTRICULAR PREMATURE BEATS: ICD-10-CM

## 2019-01-17 LAB
LV EJECT FRACT  99904: 60
LV EJECT FRACT MOD 2C 99903: 57.34
LV EJECT FRACT MOD 4C 99902: 58.49
LV EJECT FRACT MOD BP 99901: 57.17

## 2019-01-17 PROCEDURE — 93306 TTE W/DOPPLER COMPLETE: CPT | Mod: 26 | Performed by: INTERNAL MEDICINE

## 2019-01-17 PROCEDURE — 700111 HCHG RX REV CODE 636 W/ 250 OVERRIDE (IP)

## 2019-01-17 PROCEDURE — 93306 TTE W/DOPPLER COMPLETE: CPT

## 2019-01-17 PROCEDURE — A9502 TC99M TETROFOSMIN: HCPCS

## 2019-01-17 RX ORDER — REGADENOSON 0.08 MG/ML
INJECTION, SOLUTION INTRAVENOUS
Status: COMPLETED
Start: 2019-01-17 | End: 2019-01-17

## 2019-01-17 RX ADMIN — REGADENOSON 0.4 MG: 0.08 INJECTION, SOLUTION INTRAVENOUS at 11:04

## 2019-02-05 ENCOUNTER — OFFICE VISIT (OUTPATIENT)
Dept: CARDIOLOGY | Facility: MEDICAL CENTER | Age: 74
End: 2019-02-05
Payer: MEDICARE

## 2019-02-05 VITALS
DIASTOLIC BLOOD PRESSURE: 68 MMHG | HEIGHT: 60 IN | WEIGHT: 114 LBS | BODY MASS INDEX: 22.38 KG/M2 | SYSTOLIC BLOOD PRESSURE: 118 MMHG | OXYGEN SATURATION: 94 % | HEART RATE: 72 BPM

## 2019-02-05 DIAGNOSIS — E78.5 DYSLIPIDEMIA: ICD-10-CM

## 2019-02-05 DIAGNOSIS — I49.8 VENTRICULAR BIGEMINY: Chronic | ICD-10-CM

## 2019-02-05 PROCEDURE — 99214 OFFICE O/P EST MOD 30 MIN: CPT | Performed by: INTERNAL MEDICINE

## 2019-02-05 ASSESSMENT — ENCOUNTER SYMPTOMS
BRUISES/BLEEDS EASILY: 0
MEMORY LOSS: 1
CHILLS: 0
FEVER: 0
NAUSEA: 0
SORE THROAT: 0
ABDOMINAL PAIN: 0
PND: 0
PALPITATIONS: 1
FOCAL WEAKNESS: 0
DIZZINESS: 0
COUGH: 0
SHORTNESS OF BREATH: 0
CLAUDICATION: 0
BLURRED VISION: 0
WEAKNESS: 0
FALLS: 0

## 2019-02-05 NOTE — LETTER
[facility]   [address]   [address]  [place] [address]  Phone: [phone]  Fax: [phone]              Estrella Peña  1945    Encounter Date: 2/5/2019    Erik Bartlett M.D.          PROGRESS NOTE:  Chief Complaint   Patient presents with   • Hyperlipidemia       Subjective:   Estrella Peña is a 74 y.o. female who presents today for follow-up of a history of dyslipidemia and mild valve disease    On recent check she had ventricular bigeminy on EKG at the primary care office and underwent stress test and echocardiogram which were normal    Does describe occasional palpitations with chest discomfort about 20 seconds    Past Medical History:   Diagnosis Date   • Breath shortness     With exertion   • Bronchitis    • Hyperlipidemia    • Mitral regurgitation    • Unspecified disorder of thyroid 1968    No longer   • Ventricular bigeminy      Past Surgical History:   Procedure Laterality Date   • RHYTIDECTOMY  12/8/2010    Performed by ALCIRA SALCEDO at SURGERY [facility] ORS   • OTHER  2008    urethral repair     Family History   Problem Relation Age of Onset   • Cancer Mother    • Hypertension Sister    • Stroke Sister    • Other Father         Dementia   • Diabetes Unknown    • Cancer Unknown      Social History     Social History   • Marital status:      Spouse name: N/A   • Number of children: N/A   • Years of education: N/A     Occupational History   • Not on file.     Social History Main Topics   • Smoking status: Former Smoker     Packs/day: 1.00     Years: 1.00     Types: Cigarettes     Quit date: 1/1/1965   • Smokeless tobacco: Never Used   • Alcohol use 7.0 oz/week     14 Standard drinks or equivalent per week      Comment: Occasionally   • Drug use: No   • Sexual activity: Not on file     Other Topics Concern   • Not on file     Social History Narrative   • No narrative on file     Allergies   Allergen  Reactions   • Nkda [No Known Drug Allergy]      Outpatient Encounter Prescriptions as of 2/5/2019   Medication Sig Dispense Refill   • donepezil (ARICEPT) 10 MG tablet Take 10 mg by mouth every day.     • Potassium 99 MG Tab Take 1 Tab by mouth every day.     • atorvastatin (LIPITOR) 10 MG Tab Take 10 mg by mouth every evening.     • omeprazole (PRILOSEC) 20 MG delayed-release capsule Take 20 mg by mouth every day.     • therapeutic multivitamin-minerals (THERAGRAN-M) Tab Take 1 Tab by mouth every day.     • alendronate (FOSAMAX) 70 MG Tab Take 70 mg by mouth every 7 days. On Tue     • [DISCONTINUED] gabapentin (NEURONTIN) 100 MG Cap Take 100 mg by mouth as needed.     • [DISCONTINUED] ezetimibe (ZETIA) 10 MG Tab Take 1 Tab by mouth every day. (Patient not taking: Reported on 2/5/2019) 30 Tab 11   • [DISCONTINUED] DONEPEZIL HYDROCHLORIDE 10 MG TABLET DISPERSIBLE Take 1 Tab by mouth every day.     • [DISCONTINUED] cyanocobalamin (VITAMIN B-12) 500 MCG Tab Take 500 mcg by mouth every day.     • [DISCONTINUED] Red Yeast Rice Extract (RED YEAST RICE PO) Take 2 Caps by mouth every day.     • [DISCONTINUED] aspirin EC (ECOTRIN) 81 MG Tablet Delayed Response Take 81 mg by mouth every 72 hours.       No facility-administered encounter medications on file as of 2/5/2019.      Review of Systems   Constitutional: Negative for chills and fever.   HENT: Negative for sore throat.    Eyes: Negative for blurred vision.   Respiratory: Negative for cough and shortness of breath.    Cardiovascular: Positive for chest pain and palpitations. Negative for claudication, leg swelling and PND.   Gastrointestinal: Negative for abdominal pain and nausea.   Musculoskeletal: Negative for falls and joint pain.   Skin: Negative for rash.   Neurological: Negative for dizziness, focal weakness and weakness.   Endo/Heme/Allergies: Does not bruise/bleed easily.   Psychiatric/Behavioral: Positive for memory loss.        Objective:   /68 (BP  Location: Left arm, Patient Position: Sitting, BP Cuff Size: Adult)   Pulse 72   Ht 1.524 m (5')   Wt 51.7 kg (114 lb)   SpO2 94%   BMI 22.26 kg/m²      Physical Exam   Constitutional: No distress.   HENT:   Mouth/Throat: Oropharynx is clear and moist. No oropharyngeal exudate.   Eyes: No scleral icterus.   Neck: No JVD present.   Cardiovascular: Normal rate and normal heart sounds.  Exam reveals no gallop and no friction rub.    No murmur heard.  Pulmonary/Chest: No respiratory distress. She has no wheezes. She has no rales.   Abdominal: Soft. Bowel sounds are normal.   Musculoskeletal: She exhibits no edema.   Neurological: She is alert.   Skin: No rash noted. She is not diaphoretic.   Psychiatric: She has a normal mood and affect.     We reviewed the images of her echocardiogram, stress test, EKGs she did have 5% ectopy on a Holter monitor in 2017    Assessment:     1. Dyslipidemia     2. Ventricular bigeminy         Medical Decision Making:  Today's Assessment / Status / Plan:     It was my pleasure to meet with Ms. Peña.    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        She is on appropriate statin.      I will see Ms. Peña back in 1 year time and encouraged her to follow up with us over the phone or e-mail using my MyChart as issues arise.    It is my pleasure to participate in the care of Ms. Peña.  Please do not hesitate to contact me with questions or concerns.    Erik Bartlett MD PhD FACC  Cardiologist [facility]        Anne Beltrán M.D.  [address] [address]  VIA Facsimile: [phone]

## 2019-02-06 NOTE — PROGRESS NOTES
Chief Complaint   Patient presents with   • Hyperlipidemia       Subjective:   Estrella Peña is a 74 y.o. female who presents today for follow-up of a history of dyslipidemia and mild valve disease    On recent check she had ventricular bigeminy on EKG at the primary care office and underwent stress test and echocardiogram which were normal    Does describe occasional palpitations with chest discomfort about 20 seconds    Past Medical History:   Diagnosis Date   • Breath shortness     With exertion   • Bronchitis    • Hyperlipidemia    • Mitral regurgitation    • Unspecified disorder of thyroid 1968    No longer   • Ventricular bigeminy      Past Surgical History:   Procedure Laterality Date   • RHYTIDECTOMY  12/8/2010    Performed by ALCIRA SALCEDO at SURGERY [facility]   • OTHER  2008    urethral repair     Family History   Problem Relation Age of Onset   • Cancer Mother    • Hypertension Sister    • Stroke Sister    • Other Father         Dementia   • Diabetes Unknown    • Cancer Unknown      Social History     Social History   • Marital status:      Spouse name: N/A   • Number of children: N/A   • Years of education: N/A     Occupational History   • Not on file.     Social History Main Topics   • Smoking status: Former Smoker     Packs/day: 1.00     Years: 1.00     Types: Cigarettes     Quit date: 1/1/1965   • Smokeless tobacco: Never Used   • Alcohol use 7.0 oz/week     14 Standard drinks or equivalent per week      Comment: Occasionally   • Drug use: No   • Sexual activity: Not on file     Other Topics Concern   • Not on file     Social History Narrative   • No narrative on file     Allergies   Allergen Reactions   • Nkda [No Known Drug Allergy]      Outpatient Encounter Prescriptions as of 2/5/2019   Medication Sig Dispense Refill   • donepezil (ARICEPT) 10 MG tablet Take 10 mg by mouth every day.     • Potassium 99 MG Tab Take 1 Tab by mouth every day.     • atorvastatin (LIPITOR) 10 MG Tab  Take 10 mg by mouth every evening.     • omeprazole (PRILOSEC) 20 MG delayed-release capsule Take 20 mg by mouth every day.     • therapeutic multivitamin-minerals (THERAGRAN-M) Tab Take 1 Tab by mouth every day.     • alendronate (FOSAMAX) 70 MG Tab Take 70 mg by mouth every 7 days. On Tue     • [DISCONTINUED] gabapentin (NEURONTIN) 100 MG Cap Take 100 mg by mouth as needed.     • [DISCONTINUED] ezetimibe (ZETIA) 10 MG Tab Take 1 Tab by mouth every day. (Patient not taking: Reported on 2/5/2019) 30 Tab 11   • [DISCONTINUED] DONEPEZIL HYDROCHLORIDE 10 MG TABLET DISPERSIBLE Take 1 Tab by mouth every day.     • [DISCONTINUED] cyanocobalamin (VITAMIN B-12) 500 MCG Tab Take 500 mcg by mouth every day.     • [DISCONTINUED] Red Yeast Rice Extract (RED YEAST RICE PO) Take 2 Caps by mouth every day.     • [DISCONTINUED] aspirin EC (ECOTRIN) 81 MG Tablet Delayed Response Take 81 mg by mouth every 72 hours.       No facility-administered encounter medications on file as of 2/5/2019.      Review of Systems   Constitutional: Negative for chills and fever.   HENT: Negative for sore throat.    Eyes: Negative for blurred vision.   Respiratory: Negative for cough and shortness of breath.    Cardiovascular: Positive for chest pain and palpitations. Negative for claudication, leg swelling and PND.   Gastrointestinal: Negative for abdominal pain and nausea.   Musculoskeletal: Negative for falls and joint pain.   Skin: Negative for rash.   Neurological: Negative for dizziness, focal weakness and weakness.   Endo/Heme/Allergies: Does not bruise/bleed easily.   Psychiatric/Behavioral: Positive for memory loss.        Objective:   /68 (BP Location: Left arm, Patient Position: Sitting, BP Cuff Size: Adult)   Pulse 72   Ht 1.524 m (5')   Wt 51.7 kg (114 lb)   SpO2 94%   BMI 22.26 kg/m²     Physical Exam   Constitutional: No distress.   HENT:   Mouth/Throat: Oropharynx is clear and moist. No oropharyngeal exudate.   Eyes: No  scleral icterus.   Neck: No JVD present.   Cardiovascular: Normal rate and normal heart sounds.  Exam reveals no gallop and no friction rub.    No murmur heard.  Pulmonary/Chest: No respiratory distress. She has no wheezes. She has no rales.   Abdominal: Soft. Bowel sounds are normal.   Musculoskeletal: She exhibits no edema.   Neurological: She is alert.   Skin: No rash noted. She is not diaphoretic.   Psychiatric: She has a normal mood and affect.     We reviewed the images of her echocardiogram, stress test, EKGs she did have 5% ectopy on a Holter monitor in 2017    Assessment:     1. Dyslipidemia     2. Ventricular bigeminy         Medical Decision Making:  Today's Assessment / Status / Plan:     It was my pleasure to meet with Ms. Peña.    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        She is on appropriate statin.      I will see Ms. Peña back in 1 year time and encouraged her to follow up with us over the phone or e-mail using my MyChart as issues arise.    It is my pleasure to participate in the care of Ms. Peña.  Please do not hesitate to contact me with questions or concerns.    Erik Bartlett MD PhD FAC  Cardiologist [facility]

## 2019-03-18 ENCOUNTER — OFFICE VISIT (OUTPATIENT)
Dept: URGENT CARE | Facility: CLINIC | Age: 74
End: 2019-03-18
Payer: MEDICARE

## 2019-03-18 VITALS
OXYGEN SATURATION: 95 % | RESPIRATION RATE: 16 BRPM | HEART RATE: 85 BPM | WEIGHT: 111 LBS | BODY MASS INDEX: 21.79 KG/M2 | SYSTOLIC BLOOD PRESSURE: 122 MMHG | TEMPERATURE: 98.9 F | DIASTOLIC BLOOD PRESSURE: 84 MMHG | HEIGHT: 60 IN

## 2019-03-18 DIAGNOSIS — J01.00 ACUTE NON-RECURRENT MAXILLARY SINUSITIS: ICD-10-CM

## 2019-03-18 DIAGNOSIS — H10.9 BACTERIAL CONJUNCTIVITIS OF LEFT EYE: ICD-10-CM

## 2019-03-18 PROCEDURE — 99214 OFFICE O/P EST MOD 30 MIN: CPT | Performed by: PHYSICIAN ASSISTANT

## 2019-03-18 RX ORDER — POLYMYXIN B SULFATE AND TRIMETHOPRIM 1; 10000 MG/ML; [USP'U]/ML
1 SOLUTION OPHTHALMIC EVERY 4 HOURS
Qty: 10 ML | Refills: 0 | Status: SHIPPED | OUTPATIENT
Start: 2019-03-18 | End: 2019-03-25

## 2019-03-18 RX ORDER — DOXYCYCLINE HYCLATE 100 MG
100 TABLET ORAL 2 TIMES DAILY
Qty: 14 TAB | Refills: 0 | Status: SHIPPED
Start: 2019-03-18 | End: 2020-06-01

## 2019-03-18 ASSESSMENT — ENCOUNTER SYMPTOMS
SHORTNESS OF BREATH: 0
EYE REDNESS: 1
SINUS PRESSURE: 1
HEADACHES: 1
FEVER: 0
MUSCULOSKELETAL NEGATIVE: 1
CHILLS: 0
SORE THROAT: 1
COUGH: 1
EYE DISCHARGE: 1
DIZZINESS: 0
CARDIOVASCULAR NEGATIVE: 1
GASTROINTESTINAL NEGATIVE: 1
SINUS PAIN: 1
WHEEZING: 0
EYE PAIN: 0

## 2019-03-18 NOTE — PROGRESS NOTES
Subjective:      Estrella Peña is a 74 y.o. female who presents with Eye Swelling (left eye drainage yellowish color x yesterday ) and Cough (prouctive / dry cough , sinus drainage x 1 week )            Sinus Problem   This is a new problem. The current episode started in the past 7 days. The problem has been gradually worsening since onset. There has been no fever. The fever has been present for less than 1 day. Associated symptoms include congestion, coughing, headaches, sinus pressure and a sore throat. Pertinent negatives include no chills, ear pain or shortness of breath. Past treatments include nothing. The treatment provided no relief.       PMH:  has a past medical history of Breath shortness; Bronchitis; Hyperlipidemia; Mitral regurgitation; Unspecified disorder of thyroid (1968); and Ventricular bigeminy. She also has no past medical history of Breast cancer (HCC).  MEDS:   Current Outpatient Prescriptions:   •  donepezil (ARICEPT) 10 MG tablet, Take 10 mg by mouth every day., Disp: , Rfl:   •  atorvastatin (LIPITOR) 10 MG Tab, Take 10 mg by mouth every evening., Disp: , Rfl:   •  omeprazole (PRILOSEC) 20 MG delayed-release capsule, Take 20 mg by mouth every day., Disp: , Rfl:   •  therapeutic multivitamin-minerals (THERAGRAN-M) Tab, Take 1 Tab by mouth every day., Disp: , Rfl:   •  alendronate (FOSAMAX) 70 MG Tab, Take 70 mg by mouth every 7 days. On Tue, Disp: , Rfl:   •  Potassium 99 MG Tab, Take 1 Tab by mouth every day., Disp: , Rfl:   ALLERGIES:   Allergies   Allergen Reactions   • Nkda [No Known Drug Allergy]      SURGHX:   Past Surgical History:   Procedure Laterality Date   • RHYTIDECTOMY  12/8/2010    Performed by ALCIRA SALCEDO at [facility]   • OTHER  2008    urethral repair     SOCHX:  reports that she quit smoking about 54 years ago. Her smoking use included Cigarettes. She has a 1.00 pack-year smoking history. She has never used smokeless tobacco. She reports that she  drinks about 7.0 oz of alcohol per week . She reports that she does not use drugs.  FH: family history includes Cancer in her mother and unknown relative; Diabetes in her unknown relative; Hypertension in her sister; Other in her father; Stroke in her sister.      Review of Systems   Constitutional: Negative for chills and fever.   HENT: Positive for congestion, sinus pain, sinus pressure and sore throat. Negative for ear pain.    Eyes: Positive for discharge and redness. Negative for pain.   Respiratory: Positive for cough. Negative for shortness of breath and wheezing.    Cardiovascular: Negative.    Gastrointestinal: Negative.    Musculoskeletal: Negative.    Neurological: Positive for headaches. Negative for dizziness.       Medications, Allergies, and current problem list reviewed today in Epic     Objective:     /84 (BP Location: Right arm, Patient Position: Sitting, BP Cuff Size: Adult)   Pulse 85   Temp 37.2 °C (98.9 °F) (Temporal)   Resp 16   Ht 1.524 m (5')   Wt 50.3 kg (111 lb)   SpO2 95%   BMI 21.68 kg/m²      Physical Exam   Constitutional: She is oriented to person, place, and time. She appears well-developed and well-nourished. No distress.   HENT:   Head: Normocephalic and atraumatic.   Right Ear: Hearing, tympanic membrane, external ear and ear canal normal. Tympanic membrane is not erythematous. No decreased hearing is noted.   Left Ear: Hearing, tympanic membrane, external ear and ear canal normal. Tympanic membrane is not erythematous. No decreased hearing is noted.   Nose: Right sinus exhibits maxillary sinus tenderness. Left sinus exhibits maxillary sinus tenderness.   Mouth/Throat: Normal dentition. Posterior oropharyngeal erythema present. No oropharyngeal exudate.   Eyes: Pupils are equal, round, and reactive to light. EOM are normal. Right eye exhibits no discharge. Left eye exhibits discharge. Left eye exhibits no hordeolum. No foreign body present in the left eye. Left  conjunctiva is injected. No scleral icterus.   Neck: Normal range of motion. Neck supple.   Cardiovascular: Normal rate, regular rhythm and normal heart sounds.    No murmur heard.  Pulmonary/Chest: Effort normal and breath sounds normal. No respiratory distress. She has no wheezes. She has no rales.   Lymphadenopathy:        Head (right side): Submandibular adenopathy present.        Head (left side): Submandibular adenopathy present.     She has no cervical adenopathy.        Right cervical: No superficial cervical adenopathy present.       Left cervical: No superficial cervical adenopathy present.   Neurological: She is alert and oriented to person, place, and time.   Skin: Skin is warm, dry and intact. She is not diaphoretic. No cyanosis. Nails show no clubbing.   Psychiatric: She has a normal mood and affect. Her behavior is normal. Judgment and thought content normal.   Nursing note and vitals reviewed.              Assessment/Plan:     1. Acute non-recurrent maxillary sinusitis  doxycycline (VIBRAMYCIN) 100 MG Tab   2. Bacterial conjunctivitis of left eye  polymixin-trimethoprim (POLYTRIM) 34533-5.1 UNIT/ML-% Solution     Take full course of antibiotic  Tylenol and Motrin for fever and pain  OTC meds as discussed including oral decongestant if tolerated  Increase fluids and rest  Nasal spray, nasal wash, Porsha pot    Return to clinic or go to ED if symptoms worsen or persist. Indications for ED discussed at length. Patient voices understanding. Follow-up with your primary care provider in 3-5 days. Red flags discussed. All side effects of medication discussed including allergic response, GI upset, tendon injury, etc.    Please note that this dictation was created using voice recognition software. I have made every reasonable attempt to correct obvious errors, but I expect that there are errors of grammar and possibly content that I did not discover before finalizing the note.

## 2019-03-20 ENCOUNTER — HOSPITAL ENCOUNTER (OUTPATIENT)
Dept: LAB | Facility: MEDICAL CENTER | Age: 74
End: 2019-03-20
Attending: FAMILY MEDICINE
Payer: MEDICARE

## 2019-03-20 LAB
APPEARANCE UR: CLEAR
BACTERIA #/AREA URNS HPF: NEGATIVE /HPF
BILIRUB UR QL STRIP.AUTO: NEGATIVE
COLOR UR: YELLOW
EPI CELLS #/AREA URNS HPF: NEGATIVE /HPF
GLUCOSE UR STRIP.AUTO-MCNC: NEGATIVE MG/DL
HYALINE CASTS #/AREA URNS LPF: ABNORMAL /LPF
KETONES UR STRIP.AUTO-MCNC: NEGATIVE MG/DL
LEUKOCYTE ESTERASE UR QL STRIP.AUTO: NEGATIVE
MICRO URNS: ABNORMAL
NITRITE UR QL STRIP.AUTO: NEGATIVE
PH UR STRIP.AUTO: 5 [PH]
PROT UR QL STRIP: NEGATIVE MG/DL
RBC # URNS HPF: ABNORMAL /HPF
RBC UR QL AUTO: ABNORMAL
SP GR UR STRIP.AUTO: 1.02
UROBILINOGEN UR STRIP.AUTO-MCNC: 0.2 MG/DL
WBC #/AREA URNS HPF: ABNORMAL /HPF

## 2019-03-20 PROCEDURE — 81001 URINALYSIS AUTO W/SCOPE: CPT

## 2019-03-26 ENCOUNTER — TELEPHONE (OUTPATIENT)
Dept: URGENT CARE | Facility: CLINIC | Age: 74
End: 2019-03-26

## 2019-04-18 ENCOUNTER — HOSPITAL ENCOUNTER (OUTPATIENT)
Dept: LAB | Facility: MEDICAL CENTER | Age: 74
End: 2019-04-18
Attending: FAMILY MEDICINE
Payer: MEDICARE

## 2019-04-18 LAB
25(OH)D3 SERPL-MCNC: 53 NG/ML (ref 30–100)
ALBUMIN SERPL BCP-MCNC: 4.1 G/DL (ref 3.2–4.9)
ALBUMIN/GLOB SERPL: 1.6 G/DL
ALP SERPL-CCNC: 50 U/L (ref 30–99)
ALT SERPL-CCNC: 33 U/L (ref 2–50)
ANION GAP SERPL CALC-SCNC: 6 MMOL/L (ref 0–11.9)
APPEARANCE UR: CLEAR
AST SERPL-CCNC: 31 U/L (ref 12–45)
BACTERIA #/AREA URNS HPF: NEGATIVE /HPF
BASOPHILS # BLD AUTO: 0.5 % (ref 0–1.8)
BASOPHILS # BLD: 0.03 K/UL (ref 0–0.12)
BILIRUB SERPL-MCNC: 0.6 MG/DL (ref 0.1–1.5)
BILIRUB UR QL STRIP.AUTO: NEGATIVE
BUN SERPL-MCNC: 13 MG/DL (ref 8–22)
CALCIUM SERPL-MCNC: 8.9 MG/DL (ref 8.5–10.5)
CHLORIDE SERPL-SCNC: 107 MMOL/L (ref 96–112)
CHOLEST SERPL-MCNC: 189 MG/DL (ref 100–199)
CO2 SERPL-SCNC: 29 MMOL/L (ref 20–33)
COLOR UR: YELLOW
CREAT SERPL-MCNC: 0.61 MG/DL (ref 0.5–1.4)
EOSINOPHIL # BLD AUTO: 0.01 K/UL (ref 0–0.51)
EOSINOPHIL NFR BLD: 0.2 % (ref 0–6.9)
EPI CELLS #/AREA URNS HPF: NEGATIVE /HPF
ERYTHROCYTE [DISTWIDTH] IN BLOOD BY AUTOMATED COUNT: 53.9 FL (ref 35.9–50)
GLOBULIN SER CALC-MCNC: 2.5 G/DL (ref 1.9–3.5)
GLUCOSE SERPL-MCNC: 85 MG/DL (ref 65–99)
GLUCOSE UR STRIP.AUTO-MCNC: NEGATIVE MG/DL
HCT VFR BLD AUTO: 44.8 % (ref 37–47)
HDLC SERPL-MCNC: 82 MG/DL
HGB BLD-MCNC: 13.7 G/DL (ref 12–16)
HYALINE CASTS #/AREA URNS LPF: ABNORMAL /LPF
IMM GRANULOCYTES # BLD AUTO: 0.01 K/UL (ref 0–0.11)
IMM GRANULOCYTES NFR BLD AUTO: 0.2 % (ref 0–0.9)
KETONES UR STRIP.AUTO-MCNC: NEGATIVE MG/DL
LDLC SERPL CALC-MCNC: 91 MG/DL
LEUKOCYTE ESTERASE UR QL STRIP.AUTO: ABNORMAL
LYMPHOCYTES # BLD AUTO: 2.65 K/UL (ref 1–4.8)
LYMPHOCYTES NFR BLD: 41.7 % (ref 22–41)
MCH RBC QN AUTO: 31.6 PG (ref 27–33)
MCHC RBC AUTO-ENTMCNC: 30.6 G/DL (ref 33.6–35)
MCV RBC AUTO: 103.5 FL (ref 81.4–97.8)
MICRO URNS: ABNORMAL
MONOCYTES # BLD AUTO: 0.78 K/UL (ref 0–0.85)
MONOCYTES NFR BLD AUTO: 12.3 % (ref 0–13.4)
NEUTROPHILS # BLD AUTO: 2.87 K/UL (ref 2–7.15)
NEUTROPHILS NFR BLD: 45.1 % (ref 44–72)
NITRITE UR QL STRIP.AUTO: NEGATIVE
NRBC # BLD AUTO: 0 K/UL
NRBC BLD-RTO: 0 /100 WBC
PH UR STRIP.AUTO: 7 [PH]
PLATELET # BLD AUTO: 270 K/UL (ref 164–446)
PMV BLD AUTO: 10.1 FL (ref 9–12.9)
POTASSIUM SERPL-SCNC: 4.4 MMOL/L (ref 3.6–5.5)
PROT SERPL-MCNC: 6.6 G/DL (ref 6–8.2)
PROT UR QL STRIP: NEGATIVE MG/DL
RBC # BLD AUTO: 4.33 M/UL (ref 4.2–5.4)
RBC # URNS HPF: ABNORMAL /HPF
RBC UR QL AUTO: NEGATIVE
SODIUM SERPL-SCNC: 142 MMOL/L (ref 135–145)
SP GR UR STRIP.AUTO: 1.02
TRIGL SERPL-MCNC: 78 MG/DL (ref 0–149)
TSH SERPL DL<=0.005 MIU/L-ACNC: 3.67 UIU/ML (ref 0.38–5.33)
UROBILINOGEN UR STRIP.AUTO-MCNC: 0.2 MG/DL
WBC # BLD AUTO: 6.4 K/UL (ref 4.8–10.8)
WBC #/AREA URNS HPF: ABNORMAL /HPF

## 2019-04-18 PROCEDURE — 80053 COMPREHEN METABOLIC PANEL: CPT

## 2019-04-18 PROCEDURE — 84443 ASSAY THYROID STIM HORMONE: CPT

## 2019-04-18 PROCEDURE — 81001 URINALYSIS AUTO W/SCOPE: CPT

## 2019-04-18 PROCEDURE — 36415 COLL VENOUS BLD VENIPUNCTURE: CPT

## 2019-04-18 PROCEDURE — 85025 COMPLETE CBC W/AUTO DIFF WBC: CPT

## 2019-04-18 PROCEDURE — 82306 VITAMIN D 25 HYDROXY: CPT

## 2019-04-18 PROCEDURE — 80061 LIPID PANEL: CPT

## 2019-06-04 ENCOUNTER — HOSPITAL ENCOUNTER (OUTPATIENT)
Dept: RADIOLOGY | Facility: MEDICAL CENTER | Age: 74
End: 2019-06-04
Attending: FAMILY MEDICINE
Payer: MEDICARE

## 2019-06-04 DIAGNOSIS — Z12.31 BREAST CANCER SCREENING BY MAMMOGRAM: ICD-10-CM

## 2019-06-04 PROCEDURE — 77063 BREAST TOMOSYNTHESIS BI: CPT

## 2019-06-28 ENCOUNTER — HOSPITAL ENCOUNTER (OUTPATIENT)
Dept: LAB | Facility: MEDICAL CENTER | Age: 74
End: 2019-06-28
Attending: FAMILY MEDICINE
Payer: MEDICARE

## 2019-06-28 LAB
ALBUMIN SERPL BCP-MCNC: 4.1 G/DL (ref 3.2–4.9)
ALBUMIN/GLOB SERPL: 1.6 G/DL
ALP SERPL-CCNC: 47 U/L (ref 30–99)
ALT SERPL-CCNC: 25 U/L (ref 2–50)
ANION GAP SERPL CALC-SCNC: 9 MMOL/L (ref 0–11.9)
AST SERPL-CCNC: 23 U/L (ref 12–45)
BASOPHILS # BLD AUTO: 0.4 % (ref 0–1.8)
BASOPHILS # BLD: 0.04 K/UL (ref 0–0.12)
BILIRUB SERPL-MCNC: 0.5 MG/DL (ref 0.1–1.5)
BUN SERPL-MCNC: 12 MG/DL (ref 8–22)
CALCIUM SERPL-MCNC: 9.6 MG/DL (ref 8.5–10.5)
CHLORIDE SERPL-SCNC: 101 MMOL/L (ref 96–112)
CO2 SERPL-SCNC: 29 MMOL/L (ref 20–33)
CREAT SERPL-MCNC: 0.77 MG/DL (ref 0.5–1.4)
EOSINOPHIL # BLD AUTO: 0 K/UL (ref 0–0.51)
EOSINOPHIL NFR BLD: 0 % (ref 0–6.9)
ERYTHROCYTE [DISTWIDTH] IN BLOOD BY AUTOMATED COUNT: 51 FL (ref 35.9–50)
FOLATE SERPL-MCNC: >23.8 NG/ML
GLOBULIN SER CALC-MCNC: 2.5 G/DL (ref 1.9–3.5)
GLUCOSE SERPL-MCNC: 117 MG/DL (ref 65–99)
HCT VFR BLD AUTO: 45.8 % (ref 37–47)
HGB BLD-MCNC: 14 G/DL (ref 12–16)
IMM GRANULOCYTES # BLD AUTO: 0.02 K/UL (ref 0–0.11)
IMM GRANULOCYTES NFR BLD AUTO: 0.2 % (ref 0–0.9)
LYMPHOCYTES # BLD AUTO: 2.94 K/UL (ref 1–4.8)
LYMPHOCYTES NFR BLD: 32.3 % (ref 22–41)
MCH RBC QN AUTO: 31.6 PG (ref 27–33)
MCHC RBC AUTO-ENTMCNC: 30.6 G/DL (ref 33.6–35)
MCV RBC AUTO: 103.4 FL (ref 81.4–97.8)
MONOCYTES # BLD AUTO: 0.79 K/UL (ref 0–0.85)
MONOCYTES NFR BLD AUTO: 8.7 % (ref 0–13.4)
NEUTROPHILS # BLD AUTO: 5.31 K/UL (ref 2–7.15)
NEUTROPHILS NFR BLD: 58.4 % (ref 44–72)
NRBC # BLD AUTO: 0 K/UL
NRBC BLD-RTO: 0 /100 WBC
PLATELET # BLD AUTO: 336 K/UL (ref 164–446)
PMV BLD AUTO: 9.6 FL (ref 9–12.9)
POTASSIUM SERPL-SCNC: 3.8 MMOL/L (ref 3.6–5.5)
PROT SERPL-MCNC: 6.6 G/DL (ref 6–8.2)
RBC # BLD AUTO: 4.43 M/UL (ref 4.2–5.4)
SODIUM SERPL-SCNC: 139 MMOL/L (ref 135–145)
VIT B12 SERPL-MCNC: 846 PG/ML (ref 211–911)
WBC # BLD AUTO: 9.1 K/UL (ref 4.8–10.8)

## 2019-06-28 PROCEDURE — 82746 ASSAY OF FOLIC ACID SERUM: CPT

## 2019-06-28 PROCEDURE — 80053 COMPREHEN METABOLIC PANEL: CPT

## 2019-06-28 PROCEDURE — 82607 VITAMIN B-12: CPT

## 2019-06-28 PROCEDURE — 84160 ASSAY OF PROTEIN ANY SOURCE: CPT

## 2019-06-28 PROCEDURE — 82784 ASSAY IGA/IGD/IGG/IGM EACH: CPT

## 2019-06-28 PROCEDURE — 86334 IMMUNOFIX E-PHORESIS SERUM: CPT

## 2019-06-28 PROCEDURE — 84165 PROTEIN E-PHORESIS SERUM: CPT

## 2019-06-28 PROCEDURE — 36415 COLL VENOUS BLD VENIPUNCTURE: CPT

## 2019-06-28 PROCEDURE — 85025 COMPLETE CBC W/AUTO DIFF WBC: CPT

## 2019-07-01 LAB
ALBUMIN SERPL-MCNC: 4.08 G/DL (ref 3.75–5.01)
ALPHA1 GLOB SERPL ELPH-MCNC: 0.27 G/DL (ref 0.19–0.46)
ALPHA2 GLOB SERPL ELPH-MCNC: 0.87 G/DL (ref 0.48–1.05)
B-GLOBULIN SERPL ELPH-MCNC: 0.71 G/DL (ref 0.48–1.1)
GAMMA GLOB SERPL ELPH-MCNC: 0.67 G/DL (ref 0.62–1.51)
IGA SERPL-MCNC: 186 MG/DL (ref 68–408)
IGG SERPL-MCNC: 649 MG/DL (ref 768–1632)
IGM SERPL-MCNC: 94 MG/DL (ref 35–263)
INTERPRETATION SERPL IFE-IMP: ABNORMAL
INTERPRETATION SERPL IFE-IMP: ABNORMAL
PATHOLOGY STUDY: ABNORMAL
PROT SERPL-MCNC: 6.6 G/DL (ref 6–8.3)

## 2019-08-05 ENCOUNTER — HOSPITAL ENCOUNTER (OUTPATIENT)
Dept: RADIOLOGY | Facility: MEDICAL CENTER | Age: 74
End: 2019-08-05
Attending: FAMILY MEDICINE
Payer: MEDICARE

## 2019-08-05 DIAGNOSIS — R10.9 ABDOMINAL PAIN, UNSPECIFIED ABDOMINAL LOCATION: ICD-10-CM

## 2019-08-05 PROCEDURE — 74177 CT ABD & PELVIS W/CONTRAST: CPT

## 2019-08-05 PROCEDURE — 700117 HCHG RX CONTRAST REV CODE 255: Performed by: FAMILY MEDICINE

## 2019-08-05 RX ADMIN — IOHEXOL 25 ML: 240 INJECTION, SOLUTION INTRATHECAL; INTRAVASCULAR; INTRAVENOUS; ORAL at 14:30

## 2019-08-05 RX ADMIN — IOHEXOL 80 ML: 350 INJECTION, SOLUTION INTRAVENOUS at 14:30

## 2019-09-18 ENCOUNTER — APPOINTMENT (RX ONLY)
Dept: URBAN - METROPOLITAN AREA CLINIC 4 | Facility: CLINIC | Age: 74
Setting detail: DERMATOLOGY
End: 2019-09-18

## 2019-09-18 DIAGNOSIS — L81.4 OTHER MELANIN HYPERPIGMENTATION: ICD-10-CM

## 2019-09-18 DIAGNOSIS — L82.1 OTHER SEBORRHEIC KERATOSIS: ICD-10-CM

## 2019-09-18 DIAGNOSIS — D18.0 HEMANGIOMA: ICD-10-CM

## 2019-09-18 PROBLEM — D18.01 HEMANGIOMA OF SKIN AND SUBCUTANEOUS TISSUE: Status: ACTIVE | Noted: 2019-09-18

## 2019-09-18 PROCEDURE — 99213 OFFICE O/P EST LOW 20 MIN: CPT

## 2019-09-18 ASSESSMENT — LOCATION SIMPLE DESCRIPTION DERM
LOCATION SIMPLE: LEFT UPPER ARM
LOCATION SIMPLE: LEFT BREAST
LOCATION SIMPLE: LEFT FOREARM
LOCATION SIMPLE: RIGHT UPPER BACK
LOCATION SIMPLE: UPPER BACK
LOCATION SIMPLE: RIGHT FOREARM
LOCATION SIMPLE: LEFT CHEEK
LOCATION SIMPLE: RIGHT UPPER ARM

## 2019-09-18 ASSESSMENT — LOCATION DETAILED DESCRIPTION DERM
LOCATION DETAILED: LEFT MEDIAL BREAST 8-9:00 REGION
LOCATION DETAILED: RIGHT PROXIMAL RADIAL DORSAL FOREARM
LOCATION DETAILED: LEFT ANTERIOR DISTAL UPPER ARM
LOCATION DETAILED: RIGHT MID-UPPER BACK
LOCATION DETAILED: INFERIOR THORACIC SPINE
LOCATION DETAILED: LEFT CENTRAL MALAR CHEEK
LOCATION DETAILED: RIGHT ANTERIOR DISTAL UPPER ARM
LOCATION DETAILED: LEFT PROXIMAL DORSAL FOREARM

## 2019-09-18 ASSESSMENT — LOCATION ZONE DERM
LOCATION ZONE: ARM
LOCATION ZONE: TRUNK
LOCATION ZONE: FACE

## 2019-10-15 ENCOUNTER — HOSPITAL ENCOUNTER (OUTPATIENT)
Dept: LAB | Facility: MEDICAL CENTER | Age: 74
End: 2019-10-15
Attending: INTERNAL MEDICINE
Payer: MEDICARE

## 2019-10-15 LAB
HGB RETIC QN AUTO: 34.8 PG/CELL (ref 29–35)
IMM RETICS NFR: 11.4 % (ref 9.3–17.4)
LDH SERPL L TO P-CCNC: 329 U/L (ref 107–266)
RETICS # AUTO: 0.07 M/UL (ref 0.04–0.06)
RETICS/RBC NFR: 1.6 % (ref 0.8–2.1)
TSH SERPL DL<=0.005 MIU/L-ACNC: 1.81 UIU/ML (ref 0.38–5.33)

## 2019-10-15 PROCEDURE — 83883 ASSAY NEPHELOMETRY NOT SPEC: CPT | Mod: 91

## 2019-10-15 PROCEDURE — 84443 ASSAY THYROID STIM HORMONE: CPT

## 2019-10-15 PROCEDURE — 84156 ASSAY OF PROTEIN URINE: CPT

## 2019-10-15 PROCEDURE — 85046 RETICYTE/HGB CONCENTRATE: CPT

## 2019-10-15 PROCEDURE — 83615 LACTATE (LD) (LDH) ENZYME: CPT

## 2019-10-15 PROCEDURE — 36415 COLL VENOUS BLD VENIPUNCTURE: CPT

## 2019-10-16 ENCOUNTER — IMMUNIZATION (OUTPATIENT)
Dept: SOCIAL WORK | Facility: CLINIC | Age: 74
End: 2019-10-16
Payer: MEDICARE

## 2019-10-16 DIAGNOSIS — Z23 NEED FOR VACCINATION: ICD-10-CM

## 2019-10-16 PROCEDURE — G0008 ADMIN INFLUENZA VIRUS VAC: HCPCS | Performed by: REGISTERED NURSE

## 2019-10-16 PROCEDURE — 90662 IIV NO PRSV INCREASED AG IM: CPT | Performed by: REGISTERED NURSE

## 2019-10-18 LAB
ALBUMIN 24H UR QL ELPH: DETECTED
ALPHA1 GLOB 24H UR QL ELPH: ABNORMAL
ALPHA2 GLOB 24H UR QL ELPH: ABNORMAL
B-GLOBULIN UR QL ELPH: DETECTED
COLLECT DURATION TIME SPEC: ABNORMAL H
GAMMA GLOB UR QL ELPH: DETECTED
INTERPRETATION UR IFE-IMP: ABNORMAL
KAPPA LC FREE UR-MCNC: 0.32 MG/DL (ref 0.14–2.42)
KAPPA LC FREE/LAMBDA FREE UR: 10.67 RATIO (ref 2.04–10.37)
LAMBDA LC FREE UR-MCNC: 0.03 MG/DL (ref 0.02–0.67)
PROT 24H UR-MRATE: ABNORMAL MG/D (ref 10–140)
TOTAL VOLUME 1105: ABNORMAL ML

## 2019-10-31 ENCOUNTER — OFFICE VISIT (OUTPATIENT)
Dept: URGENT CARE | Facility: CLINIC | Age: 74
End: 2019-10-31
Payer: MEDICARE

## 2019-10-31 VITALS
SYSTOLIC BLOOD PRESSURE: 102 MMHG | HEART RATE: 72 BPM | HEIGHT: 60 IN | WEIGHT: 112 LBS | OXYGEN SATURATION: 94 % | TEMPERATURE: 98.6 F | RESPIRATION RATE: 16 BRPM | DIASTOLIC BLOOD PRESSURE: 64 MMHG | BODY MASS INDEX: 21.99 KG/M2

## 2019-10-31 DIAGNOSIS — S09.90XA TRAUMATIC INJURY OF HEAD, INITIAL ENCOUNTER: ICD-10-CM
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
## 2019-10-31 PROCEDURE — 99213 OFFICE O/P EST LOW 20 MIN: CPT | Performed by: NURSE PRACTITIONER

## 2019-10-31 ASSESSMENT — ENCOUNTER SYMPTOMS
SENSORY CHANGE: 0
TINGLING: 0
HEADACHES: 0
DIZZINESS: 0
DOUBLE VISION: 0
NAUSEA: 0
LOSS OF CONSCIOUSNESS: 0
BLURRED VISION: 0

## 2019-11-01 NOTE — PROGRESS NOTES
Subjective:      Estrella Peña is a 74 y.o. female who presents with Fall (injury / injury x this morning , fell back and hit back of head , bump behind head swelling has gone down  )            HPI New. 74 year old female with fall on head this morning. She was apparently out walking and slipped on ice and fell backward with head hitting pavement. She had initial headache and swelling but both have resolved. No LOC or other injuries. She has some soreness. No visual changes, nausea or headache, dizziness. She has taken ibuprofen with good relief.    Nkda [no known drug allergy]  Current Outpatient Medications on File Prior to Visit   Medication Sig Dispense Refill   • doxycycline (VIBRAMYCIN) 100 MG Tab Take 1 Tab by mouth 2 times a day. 14 Tab 0   • donepezil (ARICEPT) 10 MG tablet Take 10 mg by mouth every day.     • Potassium 99 MG Tab Take 1 Tab by mouth every day.     • atorvastatin (LIPITOR) 10 MG Tab Take 10 mg by mouth every evening.     • omeprazole (PRILOSEC) 20 MG delayed-release capsule Take 20 mg by mouth every day.     • therapeutic multivitamin-minerals (THERAGRAN-M) Tab Take 1 Tab by mouth every day.     • alendronate (FOSAMAX) 70 MG Tab Take 70 mg by mouth every 7 days. On Tue       No current facility-administered medications on file prior to visit.      Social History     Socioeconomic History   • Marital status:      Spouse name: Not on file   • Number of children: Not on file   • Years of education: Not on file   • Highest education level: Not on file   Occupational History   • Not on file   Social Needs   • Financial resource strain: Not on file   • Food insecurity:     Worry: Not on file     Inability: Not on file   • Transportation needs:     Medical: Not on file     Non-medical: Not on file   Tobacco Use   • Smoking status: Former Smoker     Packs/day: 1.00     Years: 1.00     Pack years: 1.00     Types: Cigarettes     Last attempt to quit: 1965     Years since quittin.8    • Smokeless tobacco: Never Used   Substance and Sexual Activity   • Alcohol use: Yes     Alcohol/week: 7.0 oz     Types: 14 Standard drinks or equivalent per week     Comment: Occasionally   • Drug use: No   • Sexual activity: Not on file   Lifestyle   • Physical activity:     Days per week: Not on file     Minutes per session: Not on file   • Stress: Not on file   Relationships   • Social connections:     Talks on phone: Not on file     Gets together: Not on file     Attends Spiritism service: Not on file     Active member of club or organization: Not on file     Attends meetings of clubs or organizations: Not on file     Relationship status: Not on file   • Intimate partner violence:     Fear of current or ex partner: Not on file     Emotionally abused: Not on file     Physically abused: Not on file     Forced sexual activity: Not on file   Other Topics Concern   • Not on file   Social History Narrative   • Not on file     Breast Cancer-related family history is not on file.      Review of Systems   Eyes: Negative for blurred vision and double vision.   Gastrointestinal: Negative for nausea.   Neurological: Negative for dizziness, tingling, sensory change, loss of consciousness and headaches.          Objective:     /64 (BP Location: Left arm, Patient Position: Sitting, BP Cuff Size: Adult)   Pulse 72   Temp 37 °C (98.6 °F) (Temporal)   Resp 16   Ht 1.524 m (5')   Wt 50.8 kg (112 lb)   SpO2 94%   BMI 21.87 kg/m²      Physical Exam   Constitutional: She is oriented to person, place, and time. She appears well-developed and well-nourished. No distress.   HENT:   Head: Normocephalic and atraumatic.   Right Ear: External ear and ear canal normal. Tympanic membrane is not injected and not perforated. No middle ear effusion.   Left Ear: External ear and ear canal normal. Tympanic membrane is not injected and not perforated.  No middle ear effusion.   Nose: No mucosal edema.   Mouth/Throat: No  oropharyngeal exudate or posterior oropharyngeal erythema.   Eyes: Conjunctivae are normal. Right eye exhibits no discharge. Left eye exhibits no discharge.   Neck: Normal range of motion. Neck supple.   Cardiovascular: Normal rate, regular rhythm and normal heart sounds.   No murmur heard.  Pulmonary/Chest: Effort normal and breath sounds normal. No respiratory distress.   Musculoskeletal: Normal range of motion.   Normal movement of all 4 extremities.   Lymphadenopathy:     She has no cervical adenopathy.        Right: No supraclavicular adenopathy present.        Left: No supraclavicular adenopathy present.   Neurological: She is alert and oriented to person, place, and time. Gait normal.   Skin: Skin is warm and dry.   Psychiatric: She has a normal mood and affect. Her behavior is normal. Thought content normal.   Nursing note and vitals reviewed.              Assessment/Plan:     1. Traumatic injury of head, initial encounter     2. Fall, initial encounter       Normal exam.  Strict follow up instructions given for any change or worsening of symptoms.

## 2019-11-19 ENCOUNTER — HOSPITAL ENCOUNTER (OUTPATIENT)
Dept: LAB | Facility: MEDICAL CENTER | Age: 74
End: 2019-11-19
Attending: INTERNAL MEDICINE
Payer: MEDICARE

## 2019-11-19 LAB
25(OH)D3 SERPL-MCNC: 62 NG/ML (ref 30–100)
ALBUMIN SERPL BCP-MCNC: 4 G/DL (ref 3.2–4.9)
ALBUMIN/GLOB SERPL: 1.7 G/DL
ALP SERPL-CCNC: 60 U/L (ref 30–99)
ALT SERPL-CCNC: 22 U/L (ref 2–50)
ANION GAP SERPL CALC-SCNC: 7 MMOL/L (ref 0–11.9)
APPEARANCE UR: CLEAR
AST SERPL-CCNC: 25 U/L (ref 12–45)
BACTERIA #/AREA URNS HPF: NEGATIVE /HPF
BASOPHILS # BLD AUTO: 0.5 % (ref 0–1.8)
BASOPHILS # BLD: 0.03 K/UL (ref 0–0.12)
BILIRUB SERPL-MCNC: 0.6 MG/DL (ref 0.1–1.5)
BILIRUB UR QL STRIP.AUTO: NEGATIVE
BUN SERPL-MCNC: 12 MG/DL (ref 8–22)
CALCIUM SERPL-MCNC: 9.1 MG/DL (ref 8.5–10.5)
CHLORIDE SERPL-SCNC: 105 MMOL/L (ref 96–112)
CHOLEST SERPL-MCNC: 164 MG/DL (ref 100–199)
CO2 SERPL-SCNC: 28 MMOL/L (ref 20–33)
COLOR UR: YELLOW
CREAT SERPL-MCNC: 0.6 MG/DL (ref 0.5–1.4)
EOSINOPHIL # BLD AUTO: 0.02 K/UL (ref 0–0.51)
EOSINOPHIL NFR BLD: 0.3 % (ref 0–6.9)
EPI CELLS #/AREA URNS HPF: NEGATIVE /HPF
ERYTHROCYTE [DISTWIDTH] IN BLOOD BY AUTOMATED COUNT: 50.5 FL (ref 35.9–50)
GLOBULIN SER CALC-MCNC: 2.4 G/DL (ref 1.9–3.5)
GLUCOSE SERPL-MCNC: 86 MG/DL (ref 65–99)
GLUCOSE UR STRIP.AUTO-MCNC: NEGATIVE MG/DL
HCT VFR BLD AUTO: 42.9 % (ref 37–47)
HDLC SERPL-MCNC: 75 MG/DL
HGB BLD-MCNC: 13.8 G/DL (ref 12–16)
HYALINE CASTS #/AREA URNS LPF: ABNORMAL /LPF
IMM GRANULOCYTES # BLD AUTO: 0.01 K/UL (ref 0–0.11)
IMM GRANULOCYTES NFR BLD AUTO: 0.2 % (ref 0–0.9)
KETONES UR STRIP.AUTO-MCNC: NEGATIVE MG/DL
LDLC SERPL CALC-MCNC: 77 MG/DL
LEUKOCYTE ESTERASE UR QL STRIP.AUTO: NEGATIVE
LYMPHOCYTES # BLD AUTO: 2.44 K/UL (ref 1–4.8)
LYMPHOCYTES NFR BLD: 36.6 % (ref 22–41)
MCH RBC QN AUTO: 32.6 PG (ref 27–33)
MCHC RBC AUTO-ENTMCNC: 32.2 G/DL (ref 33.6–35)
MCV RBC AUTO: 101.4 FL (ref 81.4–97.8)
MICRO URNS: ABNORMAL
MONOCYTES # BLD AUTO: 0.78 K/UL (ref 0–0.85)
MONOCYTES NFR BLD AUTO: 11.7 % (ref 0–13.4)
NEUTROPHILS # BLD AUTO: 3.38 K/UL (ref 2–7.15)
NEUTROPHILS NFR BLD: 50.7 % (ref 44–72)
NITRITE UR QL STRIP.AUTO: NEGATIVE
NRBC # BLD AUTO: 0 K/UL
NRBC BLD-RTO: 0 /100 WBC
PH UR STRIP.AUTO: 6.5 [PH] (ref 5–8)
PLATELET # BLD AUTO: 315 K/UL (ref 164–446)
PMV BLD AUTO: 10.3 FL (ref 9–12.9)
POTASSIUM SERPL-SCNC: 4.1 MMOL/L (ref 3.6–5.5)
PROT SERPL-MCNC: 6.4 G/DL (ref 6–8.2)
PROT UR QL STRIP: NEGATIVE MG/DL
RBC # BLD AUTO: 4.23 M/UL (ref 4.2–5.4)
RBC # URNS HPF: ABNORMAL /HPF
RBC UR QL AUTO: ABNORMAL
SODIUM SERPL-SCNC: 140 MMOL/L (ref 135–145)
SP GR UR STRIP.AUTO: 1.02
TRIGL SERPL-MCNC: 59 MG/DL (ref 0–149)
TSH SERPL DL<=0.005 MIU/L-ACNC: 2.19 UIU/ML (ref 0.38–5.33)
UROBILINOGEN UR STRIP.AUTO-MCNC: 0.2 MG/DL
WBC # BLD AUTO: 6.7 K/UL (ref 4.8–10.8)
WBC #/AREA URNS HPF: ABNORMAL /HPF

## 2019-11-19 PROCEDURE — 85025 COMPLETE CBC W/AUTO DIFF WBC: CPT

## 2019-11-19 PROCEDURE — 84443 ASSAY THYROID STIM HORMONE: CPT

## 2019-11-19 PROCEDURE — 82306 VITAMIN D 25 HYDROXY: CPT

## 2019-11-19 PROCEDURE — 80053 COMPREHEN METABOLIC PANEL: CPT

## 2019-11-19 PROCEDURE — 80061 LIPID PANEL: CPT

## 2019-11-19 PROCEDURE — 36415 COLL VENOUS BLD VENIPUNCTURE: CPT

## 2019-11-19 PROCEDURE — 81001 URINALYSIS AUTO W/SCOPE: CPT

## 2020-02-27 ENCOUNTER — OFFICE VISIT (OUTPATIENT)
Dept: URGENT CARE | Facility: CLINIC | Age: 75
End: 2020-02-27
Payer: MEDICARE

## 2020-02-27 ENCOUNTER — APPOINTMENT (OUTPATIENT)
Dept: RADIOLOGY | Facility: IMAGING CENTER | Age: 75
End: 2020-02-27
Attending: PHYSICIAN ASSISTANT
Payer: MEDICARE

## 2020-02-27 VITALS
TEMPERATURE: 98.8 F | WEIGHT: 110 LBS | RESPIRATION RATE: 16 BRPM | SYSTOLIC BLOOD PRESSURE: 130 MMHG | OXYGEN SATURATION: 96 % | DIASTOLIC BLOOD PRESSURE: 86 MMHG | HEIGHT: 60 IN | BODY MASS INDEX: 21.6 KG/M2 | HEART RATE: 62 BPM

## 2020-02-27 DIAGNOSIS — R07.81 RIB PAIN: Primary | ICD-10-CM

## 2020-02-27 DIAGNOSIS — R07.81 RIB PAIN: ICD-10-CM

## 2020-02-27 PROCEDURE — 99214 OFFICE O/P EST MOD 30 MIN: CPT | Performed by: PHYSICIAN ASSISTANT

## 2020-02-27 PROCEDURE — 71101 X-RAY EXAM UNILAT RIBS/CHEST: CPT | Mod: TC,LT | Performed by: PHYSICIAN ASSISTANT

## 2020-02-27 ASSESSMENT — ENCOUNTER SYMPTOMS
SHORTNESS OF BREATH: 0
NAUSEA: 0
CHILLS: 0
ABDOMINAL PAIN: 0
CONSTIPATION: 0
DIARRHEA: 0
VOMITING: 0
COUGH: 0
FEVER: 0

## 2020-02-28 NOTE — PATIENT INSTRUCTIONS
Costochondritis  Costochondritis is swelling and irritation (inflammation) of the tissue (cartilage) that connects your ribs to your breastbone (sternum). This causes pain in the front of your chest. The pain usually starts gradually and involves more than one rib.  What are the causes?  The exact cause of this condition is not always known. It results from stress on the cartilage where your ribs attach to your sternum. The cause of this stress could be:  · Chest injury (trauma).  · Exercise or activity, such as lifting.  · Severe coughing.  What increases the risk?  You may be at higher risk for this condition if you:  · Are female.  · Are 30?40 years old.  · Recently started a new exercise or work activity.  · Have low levels of vitamin D.  · Have a condition that makes you cough frequently.  What are the signs or symptoms?  The main symptom of this condition is chest pain. The pain:  · Usually starts gradually and can be sharp or dull.  · Gets worse with deep breathing, coughing, or exercise.  · Gets better with rest.  · May be worse when you press on the sternum-rib connection (tenderness).  How is this diagnosed?  This condition is diagnosed based on your symptoms, medical history, and a physical exam. Your health care provider will check for tenderness when pressing on your sternum. This is the most important finding. You may also have tests to rule out other causes of chest pain. These may include:  · A chest X-ray to check for lung problems.  · An electrocardiogram (ECG) to see if you have a heart problem that could be causing the pain.  · An imaging scan to rule out a chest or rib fracture.  How is this treated?  This condition usually goes away on its own over time. Your health care provider may prescribe an NSAID to reduce pain and inflammation. Your health care provider may also suggest that you:  · Rest and avoid activities that make pain worse.  · Apply heat or cold to the area to reduce pain and  inflammation.  · Do exercises to stretch your chest muscles.  If these treatments do not help, your health care provider may inject a numbing medicine at the sternum-rib connection to help relieve the pain.  Follow these instructions at home:  · Avoid activities that make pain worse. This includes any activities that use chest, abdominal, and side muscles.  · If directed, put ice on the painful area:  ¨ Put ice in a plastic bag.  ¨ Place a towel between your skin and the bag.  ¨ Leave the ice on for 20 minutes, 2-3 times a day.  · If directed, apply heat to the affected area as often as told by your health care provider. Use the heat source that your health care provider recommends, such as a moist heat pack or a heating pad.  ¨ Place a towel between your skin and the heat source.  ¨ Leave the heat on for 20-30 minutes.  ¨ Remove the heat if your skin turns bright red. This is especially important if you are unable to feel pain, heat, or cold. You may have a greater risk of getting burned.  · Take over-the-counter and prescription medicines only as told by your health care provider.  · Return to your normal activities as told by your health care provider. Ask your health care provider what activities are safe for you.  · Keep all follow-up visits as told by your health care provider. This is important.  Contact a health care provider if:  · You have chills or a fever.  · Your pain does not go away or it gets worse.  · You have a cough that does not go away (is persistent).  Get help right away if:  · You have shortness of breath.  This information is not intended to replace advice given to you by your health care provider. Make sure you discuss any questions you have with your health care provider.  Document Released: 09/27/2006 Document Revised: 07/07/2017 Document Reviewed: 04/12/2017  [employer] Interactive Patient Education © 2017 [employer]

## 2020-02-28 NOTE — PROGRESS NOTES
Subjective:   Estrella Peña is a 75 y.o. female who presents for Rib Pain (x today, pain is constant)        HPI     L lower anterior rib pain x 1 day. Palpation worsens sx. Pain is constant. Pain described as 4/10 ache. She has not tried to treat pain. This is the first episode of its kind.    She had a yoga yesterday but does not recall an injury. She normally completes 3 senior yoga classes a week. She also has been doing PT regularly for cervical spine spur.     She denies SOB, CP, nausea or vomiting. No numbness or tingling in extremities. No abdominal pain.      Review of Systems   Constitutional: Negative for chills, fever and malaise/fatigue.   Respiratory: Negative for cough and shortness of breath.    Gastrointestinal: Negative for abdominal pain, constipation, diarrhea, nausea and vomiting.   Musculoskeletal:        Pos rib pain   All other systems reviewed and are negative.        PMH:  has a past medical history of Breath shortness, Bronchitis, Hyperlipidemia, Mitral regurgitation, Unspecified disorder of thyroid (1968), and Ventricular bigeminy. She also has no past medical history of Breast cancer (HCC).  MEDS:   Current Outpatient Medications:   •  atorvastatin (LIPITOR) 10 MG Tab, Take 10 mg by mouth every evening., Disp: , Rfl:   •  omeprazole (PRILOSEC) 20 MG delayed-release capsule, Take 20 mg by mouth every day., Disp: , Rfl:   •  therapeutic multivitamin-minerals (THERAGRAN-M) Tab, Take 1 Tab by mouth every day., Disp: , Rfl:   •  alendronate (FOSAMAX) 70 MG Tab, Take 70 mg by mouth every 7 days. On Tue, Disp: , Rfl:   •  doxycycline (VIBRAMYCIN) 100 MG Tab, Take 1 Tab by mouth 2 times a day. (Patient not taking: Reported on 2/27/2020), Disp: 14 Tab, Rfl: 0  •  donepezil (ARICEPT) 10 MG tablet, Take 10 mg by mouth every day., Disp: , Rfl:   •  Potassium 99 MG Tab, Take 1 Tab by mouth every day., Disp: , Rfl:   ALLERGIES:   Allergies   Allergen Reactions   • Nkda [No Known Drug Allergy]       SURGHX:   Past Surgical History:   Procedure Laterality Date   • RHYTIDECTOMY  12/8/2010    Performed by ALCIRA SALCEDO at SURGERY [facility] ORS   • OTHER  2008    urethral repair     SOCHX:  reports that she quit smoking about 55 years ago. Her smoking use included cigarettes. She has a 1.00 pack-year smoking history. She has never used smokeless tobacco. She reports current alcohol use of about 7.0 oz of alcohol per week. She reports that she does not use drugs.  Family History   Problem Relation Age of Onset   • Cancer Mother    • Hypertension Sister    • Stroke Sister    • Other Father         Dementia   • Diabetes Other    • Cancer Other         Objective:   /86 (BP Location: Left arm, Patient Position: Sitting, BP Cuff Size: Adult)   Pulse 62   Temp 37.1 °C (98.8 °F) (Temporal)   Resp 16   Ht 1.524 m (5')   Wt 49.9 kg (110 lb)   SpO2 96%   BMI 21.48 kg/m²     Physical Exam  Vitals signs reviewed.   Constitutional:       General: She is not in acute distress.     Appearance: She is well-developed.   HENT:      Head: Normocephalic and atraumatic.      Right Ear: External ear normal.      Left Ear: External ear normal.      Nose: Nose normal.      Mouth/Throat:      Mouth: Mucous membranes are moist.   Eyes:      Conjunctiva/sclera: Conjunctivae normal.      Pupils: Pupils are equal, round, and reactive to light.   Neck:      Musculoskeletal: Normal range of motion and neck supple.      Trachea: No tracheal deviation.   Cardiovascular:      Rate and Rhythm: Normal rate and regular rhythm.   Pulmonary:      Effort: Pulmonary effort is normal. No respiratory distress.      Breath sounds: Normal breath sounds. No wheezing, rhonchi or rales.   Chest:       Abdominal:      General: There is no distension.      Palpations: There is no mass.      Tenderness: There is no abdominal tenderness.      Hernia: No hernia is present.   Skin:     General: Skin is warm and dry.      Capillary Refill:  Capillary refill takes less than 2 seconds.   Neurological:      General: No focal deficit present.      Mental Status: She is alert and oriented to person, place, and time.   Psychiatric:         Mood and Affect: Mood normal.         Behavior: Behavior normal.       Rib XR IMPRESSION:     1.  No evidence for acute or pulmonary disease.  2.  No acute LEFT rib findings.      Assessment/Plan:     1. Rib pain  [id number] (WITH 1-VIEW CXR) LEFT    REFERRAL TO FOLLOW-UP WITH PRIMARY CARE     Supportive care reviewed.  Rest, ice, ibuprofen 600 mg 3 times daily as needed for pain.  Costochondritis handout provided.    Follow-up with primary care provider within 7 days, referral placed.  If symptoms worsen or persist patient can return to clinic for reevaluation.  Red flags and STRICT emergency room precautions discussed. All side effects of medication discussed including allergic response, GI upset, tendon injury, etc. Patient confirmed understanding of information.    Please note that this dictation was created using voice recognition software. I have made every reasonable attempt to correct obvious errors, but I expect that there are errors of grammar and possibly content that I did not discover before finalizing the note.

## 2020-04-10 ENCOUNTER — HOSPITAL ENCOUNTER (OUTPATIENT)
Dept: LAB | Facility: MEDICAL CENTER | Age: 75
End: 2020-04-10
Attending: FAMILY MEDICINE
Payer: MEDICARE

## 2020-04-10 LAB
25(OH)D3 SERPL-MCNC: 51 NG/ML (ref 30–100)
ALBUMIN SERPL BCP-MCNC: 3.9 G/DL (ref 3.2–4.9)
ALBUMIN/GLOB SERPL: 1.6 G/DL
ALP SERPL-CCNC: 59 U/L (ref 30–99)
ALT SERPL-CCNC: 26 U/L (ref 2–50)
ANION GAP SERPL CALC-SCNC: 12 MMOL/L (ref 7–16)
AST SERPL-CCNC: 24 U/L (ref 12–45)
BASOPHILS # BLD AUTO: 0.7 % (ref 0–1.8)
BASOPHILS # BLD: 0.04 K/UL (ref 0–0.12)
BILIRUB SERPL-MCNC: 0.4 MG/DL (ref 0.1–1.5)
BUN SERPL-MCNC: 15 MG/DL (ref 8–22)
CALCIUM SERPL-MCNC: 8.9 MG/DL (ref 8.5–10.5)
CHLORIDE SERPL-SCNC: 104 MMOL/L (ref 96–112)
CHOLEST SERPL-MCNC: 174 MG/DL (ref 100–199)
CO2 SERPL-SCNC: 26 MMOL/L (ref 20–33)
CREAT SERPL-MCNC: 0.68 MG/DL (ref 0.5–1.4)
EOSINOPHIL # BLD AUTO: 0 K/UL (ref 0–0.51)
EOSINOPHIL NFR BLD: 0 % (ref 0–6.9)
ERYTHROCYTE [DISTWIDTH] IN BLOOD BY AUTOMATED COUNT: 51.8 FL (ref 35.9–50)
FASTING STATUS PATIENT QL REPORTED: NORMAL
GLOBULIN SER CALC-MCNC: 2.4 G/DL (ref 1.9–3.5)
GLUCOSE SERPL-MCNC: 78 MG/DL (ref 65–99)
HBV SURFACE AB SERPL IA-ACNC: <3.5 MIU/ML (ref 0–10)
HCT VFR BLD AUTO: 43.5 % (ref 37–47)
HDLC SERPL-MCNC: 82 MG/DL
HGB BLD-MCNC: 14.1 G/DL (ref 12–16)
IMM GRANULOCYTES # BLD AUTO: 0.01 K/UL (ref 0–0.11)
IMM GRANULOCYTES NFR BLD AUTO: 0.2 % (ref 0–0.9)
LDLC SERPL CALC-MCNC: 79 MG/DL
LYMPHOCYTES # BLD AUTO: 2.48 K/UL (ref 1–4.8)
LYMPHOCYTES NFR BLD: 40.5 % (ref 22–41)
MCH RBC QN AUTO: 32.3 PG (ref 27–33)
MCHC RBC AUTO-ENTMCNC: 32.4 G/DL (ref 33.6–35)
MCV RBC AUTO: 99.5 FL (ref 81.4–97.8)
MONOCYTES # BLD AUTO: 0.82 K/UL (ref 0–0.85)
MONOCYTES NFR BLD AUTO: 13.4 % (ref 0–13.4)
NEUTROPHILS # BLD AUTO: 2.77 K/UL (ref 2–7.15)
NEUTROPHILS NFR BLD: 45.2 % (ref 44–72)
NRBC # BLD AUTO: 0 K/UL
NRBC BLD-RTO: 0 /100 WBC
PLATELET # BLD AUTO: 304 K/UL (ref 164–446)
PMV BLD AUTO: 9.7 FL (ref 9–12.9)
POTASSIUM SERPL-SCNC: 4.1 MMOL/L (ref 3.6–5.5)
PROT SERPL-MCNC: 6.3 G/DL (ref 6–8.2)
RBC # BLD AUTO: 4.37 M/UL (ref 4.2–5.4)
SODIUM SERPL-SCNC: 142 MMOL/L (ref 135–145)
TRIGL SERPL-MCNC: 65 MG/DL (ref 0–149)
TSH SERPL DL<=0.005 MIU/L-ACNC: 3.6 UIU/ML (ref 0.38–5.33)
WBC # BLD AUTO: 6.1 K/UL (ref 4.8–10.8)

## 2020-04-10 PROCEDURE — 36415 COLL VENOUS BLD VENIPUNCTURE: CPT

## 2020-04-10 PROCEDURE — 86706 HEP B SURFACE ANTIBODY: CPT

## 2020-04-10 PROCEDURE — 86765 RUBEOLA ANTIBODY: CPT

## 2020-04-10 PROCEDURE — 80061 LIPID PANEL: CPT

## 2020-04-10 PROCEDURE — 86762 RUBELLA ANTIBODY: CPT

## 2020-04-10 PROCEDURE — 85025 COMPLETE CBC W/AUTO DIFF WBC: CPT

## 2020-04-10 PROCEDURE — 86735 MUMPS ANTIBODY: CPT

## 2020-04-10 PROCEDURE — 82306 VITAMIN D 25 HYDROXY: CPT

## 2020-04-10 PROCEDURE — 84443 ASSAY THYROID STIM HORMONE: CPT

## 2020-04-10 PROCEDURE — 86787 VARICELLA-ZOSTER ANTIBODY: CPT

## 2020-04-10 PROCEDURE — 80053 COMPREHEN METABOLIC PANEL: CPT

## 2020-04-13 LAB
MEV IGG SER IA-ACNC: 1.46
MUV IGG SER IA-ACNC: 0.66
RUBV AB SER QL: 146 IU/ML
VZV IGG SER IA-ACNC: 1.82

## 2020-04-24 ENCOUNTER — HOSPITAL ENCOUNTER (OUTPATIENT)
Dept: LAB | Facility: MEDICAL CENTER | Age: 75
End: 2020-04-24
Attending: INTERNAL MEDICINE
Payer: MEDICARE

## 2020-04-24 LAB
BASOPHILS # BLD AUTO: 0.5 % (ref 0–1.8)
BASOPHILS # BLD: 0.03 K/UL (ref 0–0.12)
EOSINOPHIL # BLD AUTO: 0.01 K/UL (ref 0–0.51)
EOSINOPHIL NFR BLD: 0.2 % (ref 0–6.9)
ERYTHROCYTE [DISTWIDTH] IN BLOOD BY AUTOMATED COUNT: 52.8 FL (ref 35.9–50)
HCT VFR BLD AUTO: 40.9 % (ref 37–47)
HGB BLD-MCNC: 13.3 G/DL (ref 12–16)
HGB RETIC QN AUTO: 34.7 PG/CELL (ref 29–35)
IMM GRANULOCYTES # BLD AUTO: 0.01 K/UL (ref 0–0.11)
IMM GRANULOCYTES NFR BLD AUTO: 0.2 % (ref 0–0.9)
IMM RETICS NFR: 14.1 % (ref 9.3–17.4)
LDH SERPL L TO P-CCNC: 275 U/L (ref 107–266)
LYMPHOCYTES # BLD AUTO: 1.91 K/UL (ref 1–4.8)
LYMPHOCYTES NFR BLD: 28.7 % (ref 22–41)
MCH RBC QN AUTO: 32.6 PG (ref 27–33)
MCHC RBC AUTO-ENTMCNC: 32.5 G/DL (ref 33.6–35)
MCV RBC AUTO: 100.2 FL (ref 81.4–97.8)
MONOCYTES # BLD AUTO: 0.89 K/UL (ref 0–0.85)
MONOCYTES NFR BLD AUTO: 13.4 % (ref 0–13.4)
NEUTROPHILS # BLD AUTO: 3.8 K/UL (ref 2–7.15)
NEUTROPHILS NFR BLD: 57 % (ref 44–72)
NRBC # BLD AUTO: 0 K/UL
NRBC BLD-RTO: 0 /100 WBC
PLATELET # BLD AUTO: 259 K/UL (ref 164–446)
PMV BLD AUTO: 10 FL (ref 9–12.9)
RBC # BLD AUTO: 4.08 M/UL (ref 4.2–5.4)
RETICS # AUTO: 0.05 M/UL (ref 0.04–0.06)
RETICS/RBC NFR: 1.2 % (ref 0.8–2.1)
WBC # BLD AUTO: 6.7 K/UL (ref 4.8–10.8)

## 2020-04-24 PROCEDURE — 36415 COLL VENOUS BLD VENIPUNCTURE: CPT

## 2020-04-24 PROCEDURE — 85025 COMPLETE CBC W/AUTO DIFF WBC: CPT

## 2020-04-24 PROCEDURE — 85046 RETICYTE/HGB CONCENTRATE: CPT

## 2020-04-24 PROCEDURE — 83615 LACTATE (LD) (LDH) ENZYME: CPT

## 2020-05-06 ENCOUNTER — HOSPITAL ENCOUNTER (OUTPATIENT)
Dept: RADIOLOGY | Facility: MEDICAL CENTER | Age: 75
End: 2020-05-06
Attending: FAMILY MEDICINE
Payer: MEDICARE

## 2020-05-06 DIAGNOSIS — R10.31 RIGHT LOWER QUADRANT PAIN: ICD-10-CM

## 2020-05-06 PROCEDURE — 76830 TRANSVAGINAL US NON-OB: CPT

## 2020-06-01 ENCOUNTER — OFFICE VISIT (OUTPATIENT)
Dept: CARDIOLOGY | Facility: MEDICAL CENTER | Age: 75
End: 2020-06-01
Payer: MEDICARE

## 2020-06-01 VITALS — BODY MASS INDEX: 21.48 KG/M2 | HEIGHT: 60 IN

## 2020-06-01 DIAGNOSIS — E78.5 DYSLIPIDEMIA: ICD-10-CM

## 2020-06-01 DIAGNOSIS — I49.8 VENTRICULAR BIGEMINY: Chronic | ICD-10-CM

## 2020-06-01 DIAGNOSIS — I34.0 NONRHEUMATIC MITRAL VALVE REGURGITATION: ICD-10-CM

## 2020-06-01 PROCEDURE — 99213 OFFICE O/P EST LOW 20 MIN: CPT | Performed by: INTERNAL MEDICINE

## 2020-06-01 ASSESSMENT — ENCOUNTER SYMPTOMS
SORE THROAT: 0
ABDOMINAL PAIN: 1
DIZZINESS: 0
FOCAL WEAKNESS: 0
PALPITATIONS: 0
BRUISES/BLEEDS EASILY: 0
FEVER: 0
CLAUDICATION: 0
COUGH: 0
NAUSEA: 0
SHORTNESS OF BREATH: 0
PND: 0
FALLS: 0
BLURRED VISION: 0
CHILLS: 0
WEAKNESS: 0

## 2020-06-01 NOTE — PROGRESS NOTES
Chief Complaint   Patient presents with   • Hyperlipidemia       Subjective:   Estrella Peña is a 75 y.o. female who presents today for follow-up of a history of dyslipidemia mild mitral valve regurgitation and ventricular bigeminy    She has been doing well she had a CAT scan last year for abdominal pains which most likely her lactose intolerance setting she did have atherosclerosis of aorta she is currently and is having atherosclerosis of coronary arteries but actually is not seen appears to be mitral annular calcification    She denies any palpitations or chest pains    Past Medical History:   Diagnosis Date   • Breath shortness     With exertion   • Bronchitis    • Hyperlipidemia    • Mitral regurgitation    • Unspecified disorder of thyroid     No longer   • Ventricular bigeminy      Past Surgical History:   Procedure Laterality Date   • RHYTIDECTOMY  2010    Performed by ALCIRA [facility]CEDO at [facility]   • OTHER      urethral repair     Family History   Problem Relation Age of Onset   • Cancer Mother    • Hypertension Sister    • Stroke Sister    • Other Father         Dementia   • Diabetes Other    • Cancer Other      Social History     Socioeconomic History   • Marital status:      Spouse name: Not on file   • Number of children: Not on file   • Years of education: Not on file   • Highest education level: Not on file   Occupational History   • Not on file   Social Needs   • Financial resource strain: Not on file   • Food insecurity     Worry: Not on file     Inability: Not on file   • Transportation needs     Medical: Not on file     Non-medical: Not on file   Tobacco Use   • Smoking status: Former Smoker     Packs/day: 1.00     Years: 1.00     Pack years: 1.00     Types: Cigarettes     Last attempt to quit: 1965     Years since quittin.4   • Smokeless tobacco: Never Used   Substance and Sexual Activity   • Alcohol use: Yes     Alcohol/week: 7.0 oz     Types: 14  Standard drinks or equivalent per week     Comment: Occasionally   • Drug use: No   • Sexual activity: Not on file   Lifestyle   • Physical activity     Days per week: Not on file     Minutes per session: Not on file   • Stress: Not on file   Relationships   • Social connections     Talks on phone: Not on file     Gets together: Not on file     Attends Religion service: Not on file     Active member of club or organization: Not on file     Attends meetings of clubs or organizations: Not on file     Relationship status: Not on file   • Intimate partner violence     Fear of current or ex partner: Not on file     Emotionally abused: Not on file     Physically abused: Not on file     Forced sexual activity: Not on file   Other Topics Concern   • Not on file   Social History Narrative   • Not on file     Allergies   Allergen Reactions   • Nkda [No Known Drug Allergy]      Outpatient Encounter Medications as of 6/1/2020   Medication Sig Dispense Refill   • donepezil (ARICEPT) 10 MG tablet Take 10 mg by mouth every day.     • atorvastatin (LIPITOR) 10 MG Tab Take 10 mg by mouth every evening.     • omeprazole (PRILOSEC) 20 MG delayed-release capsule Take 20 mg by mouth every day.     • therapeutic multivitamin-minerals (THERAGRAN-M) Tab Take 1 Tab by mouth every day.     • [DISCONTINUED] doxycycline (VIBRAMYCIN) 100 MG Tab Take 1 Tab by mouth 2 times a day. (Patient not taking: Reported on 2/27/2020) 14 Tab 0   • [DISCONTINUED] Potassium 99 MG Tab Take 1 Tab by mouth every day.     • alendronate (FOSAMAX) 70 MG Tab Take 70 mg by mouth every 7 days. On Tue       No facility-administered encounter medications on file as of 6/1/2020.      Review of Systems   Constitutional: Negative for chills and fever.   HENT: Negative for sore throat.    Eyes: Negative for blurred vision.   Respiratory: Negative for cough and shortness of breath.    Cardiovascular: Negative for chest pain, palpitations, claudication, leg swelling and PND.    Gastrointestinal: Positive for abdominal pain. Negative for nausea.   Musculoskeletal: Negative for falls and joint pain.   Skin: Negative for rash.   Neurological: Negative for dizziness, focal weakness and weakness.   Endo/Heme/Allergies: Does not bruise/bleed easily.        Objective:   BP (!) 0/0 (BP Location: Left arm, Patient Position: Sitting, BP Cuff Size: Adult)   Ht 1.524 m (5')   BMI 21.48 kg/m²     Physical Exam   Constitutional: No distress.   HENT:   Mouth/Throat: Oropharynx is clear and moist. No oropharyngeal exudate.   Eyes: No scleral icterus.   Neck: No JVD present.   Cardiovascular: Normal rate. Exam reveals no gallop and no friction rub.   Murmur heard.  Pulmonary/Chest: No respiratory distress. She has no wheezes. She has no rales.   Abdominal: Soft. Bowel sounds are normal.   Musculoskeletal:         General: No edema.   Neurological: She is alert.   Skin: No rash noted. She is not diaphoretic.   Psychiatric: She has a normal mood and affect.     We reviewed in person the most recent labs  Recent Results (from the past 4032 hour(s))   HEP B SURFACE AB    Collection Time: 04/10/20  6:39 AM   Result Value Ref Range    Hep B Surface Antibody Quant <3.50 0.00 - 10.00 mIU/mL   ESTIMATED GFR    Collection Time: 04/10/20  6:39 AM   Result Value Ref Range    GFR If African American >60 >60 mL/min/1.73 m 2    GFR If Non African American >60 >60 mL/min/1.73 m 2   Lipid Profile    Collection Time: 04/10/20  6:39 AM   Result Value Ref Range    Cholesterol,Tot 174 100 - 199 mg/dL    Triglycerides 65 0 - 149 mg/dL    HDL 82 >=40 mg/dL    LDL 79 <100 mg/dL   Comp Metabolic Panel    Collection Time: 04/10/20  6:39 AM   Result Value Ref Range    Sodium 142 135 - 145 mmol/L    Potassium 4.1 3.6 - 5.5 mmol/L    Chloride 104 96 - 112 mmol/L    Co2 26 20 - 33 mmol/L    Anion Gap 12.0 7.0 - 16.0    Glucose 78 65 - 99 mg/dL    Bun 15 8 - 22 mg/dL    Creatinine 0.68 0.50 - 1.40 mg/dL    Calcium 8.9 8.5 - 10.5  mg/dL    AST(SGOT) 24 12 - 45 U/L    ALT(SGPT) 26 2 - 50 U/L    Alkaline Phosphatase 59 30 - 99 U/L    Total Bilirubin 0.4 0.1 - 1.5 mg/dL    Albumin 3.9 3.2 - 4.9 g/dL    Total Protein 6.3 6.0 - 8.2 g/dL    Globulin 2.4 1.9 - 3.5 g/dL    A-G Ratio 1.6 g/dL   FASTING STATUS    Collection Time: 04/10/20  6:39 AM   Result Value Ref Range    Fasting Status Fasting    CBC WITH DIFFERENTIAL    Collection Time: 04/10/20  6:39 AM   Result Value Ref Range    WBC 6.1 4.8 - 10.8 K/uL    RBC 4.37 4.20 - 5.40 M/uL    Hemoglobin 14.1 12.0 - 16.0 g/dL    Hematocrit 43.5 37.0 - 47.0 %    MCV 99.5 (H) 81.4 - 97.8 fL    MCH 32.3 27.0 - 33.0 pg    MCHC 32.4 (L) 33.6 - 35.0 g/dL    RDW 51.8 (H) 35.9 - 50.0 fL    Platelet Count 304 164 - 446 K/uL    MPV 9.7 9.0 - 12.9 fL    Neutrophils-Polys 45.20 44.00 - 72.00 %    Lymphocytes 40.50 22.00 - 41.00 %    Monocytes 13.40 0.00 - 13.40 %    Eosinophils 0.00 0.00 - 6.90 %    Basophils 0.70 0.00 - 1.80 %    Immature Granulocytes 0.20 0.00 - 0.90 %    Nucleated RBC 0.00 /100 WBC    Neutrophils (Absolute) 2.77 2.00 - 7.15 K/uL    Lymphs (Absolute) 2.48 1.00 - 4.80 K/uL    Monos (Absolute) 0.82 0.00 - 0.85 K/uL    Eos (Absolute) 0.00 0.00 - 0.51 K/uL    Baso (Absolute) 0.04 0.00 - 0.12 K/uL    Immature Granulocytes (abs) 0.01 0.00 - 0.11 K/uL    NRBC (Absolute) 0.00 K/uL   VITAMIN D,25 HYDROXY    Collection Time: 04/10/20  6:39 AM   Result Value Ref Range    25-Hydroxy   Vitamin D 25 51 30 - 100 ng/mL   MEASLES/MUMPS/RUBELLA IMMUNITY    Collection Time: 04/10/20  6:39 AM   Result Value Ref Range    Rubeola Igg Antibody 1.46     Mumps Ab Igg 0.66     Rubella IgG Antibody 146.00 IU/mL   TSH    Collection Time: 04/10/20  6:39 AM   Result Value Ref Range    TSH 3.600 0.380 - 5.330 uIU/mL   VARICELLA ZOSTER IGG AB    Collection Time: 04/10/20  6:39 AM   Result Value Ref Range    Varicella Zoster IgG Ab 1.82    CBC WITH DIFFERENTIAL    Collection Time: 04/24/20 12:24 PM   Result Value Ref Range    WBC  6.7 4.8 - 10.8 K/uL    RBC 4.08 (L) 4.20 - 5.40 M/uL    Hemoglobin 13.3 12.0 - 16.0 g/dL    Hematocrit 40.9 37.0 - 47.0 %    .2 (H) 81.4 - 97.8 fL    MCH 32.6 27.0 - 33.0 pg    MCHC 32.5 (L) 33.6 - 35.0 g/dL    RDW 52.8 (H) 35.9 - 50.0 fL    Platelet Count 259 164 - 446 K/uL    MPV 10.0 9.0 - 12.9 fL    Neutrophils-Polys 57.00 44.00 - 72.00 %    Lymphocytes 28.70 22.00 - 41.00 %    Monocytes 13.40 0.00 - 13.40 %    Eosinophils 0.20 0.00 - 6.90 %    Basophils 0.50 0.00 - 1.80 %    Immature Granulocytes 0.20 0.00 - 0.90 %    Nucleated RBC 0.00 /100 WBC    Neutrophils (Absolute) 3.80 2.00 - 7.15 K/uL    Lymphs (Absolute) 1.91 1.00 - 4.80 K/uL    Monos (Absolute) 0.89 (H) 0.00 - 0.85 K/uL    Eos (Absolute) 0.01 0.00 - 0.51 K/uL    Baso (Absolute) 0.03 0.00 - 0.12 K/uL    Immature Granulocytes (abs) 0.01 0.00 - 0.11 K/uL    NRBC (Absolute) 0.00 K/uL   RETICULOCYTES COUNT    Collection Time: 04/24/20 12:24 PM   Result Value Ref Range    Reticulocyte Count 1.2 0.8 - 2.1 %    Retic, Absolute 0.05 0.04 - 0.06 M/uL    Imm. Reticulocyte Fraction 14.1 9.3 - 17.4 %    Retic Hgb Equivalent 34.7 29.0 - 35.0 pg/cell   LDH    Collection Time: 04/24/20 12:24 PM   Result Value Ref Range    LDH Total 275 (H) 107 - 266 U/L     We reviewed the images of her CAT scan      Assessment:     1. Dyslipidemia     2. Nonrheumatic mitral valve regurgitation     3. Ventricular bigeminy         Medical Decision Making:  Today's Assessment / Status / Plan:     It was my pleasure to meet with Ms. Peña.    Blood pressure is well controlled.  We specifically assessed the labs on hypertension treatment    She is on appropriate statin.    I will see Ms. Peña back in 5 years time and encouraged her to follow up with us over the phone or electronically using my MyChart as issues arise.    It is my pleasure to participate in the care of Ms. Peña.  Please do not hesitate to contact me with questions or concerns.    Erik Bartlett MD PhD  FAC  Cardiologist [facility]    Please note that this dictation was created using voice recognition software. There may be errors I did not discover before finalizing the note.

## 2020-06-22 ENCOUNTER — APPOINTMENT (RX ONLY)
Dept: URBAN - METROPOLITAN AREA CLINIC 4 | Facility: CLINIC | Age: 75
Setting detail: DERMATOLOGY
End: 2020-06-22

## 2020-06-22 DIAGNOSIS — D69.2 OTHER NONTHROMBOCYTOPENIC PURPURA: ICD-10-CM

## 2020-06-22 DIAGNOSIS — L82.0 INFLAMED SEBORRHEIC KERATOSIS: ICD-10-CM

## 2020-06-22 PROCEDURE — 99212 OFFICE O/P EST SF 10 MIN: CPT

## 2020-06-22 PROCEDURE — ? LIQUID NITROGEN (COSMETIC)

## 2020-06-22 ASSESSMENT — LOCATION DETAILED DESCRIPTION DERM
LOCATION DETAILED: LEFT DISTAL DORSAL FOREARM
LOCATION DETAILED: RIGHT ANTERIOR SHOULDER

## 2020-06-22 ASSESSMENT — LOCATION ZONE DERM: LOCATION ZONE: ARM

## 2020-06-22 ASSESSMENT — LOCATION SIMPLE DESCRIPTION DERM
LOCATION SIMPLE: LEFT FOREARM
LOCATION SIMPLE: RIGHT SHOULDER

## 2020-07-21 ENCOUNTER — HOSPITAL ENCOUNTER (OUTPATIENT)
Dept: RADIOLOGY | Facility: MEDICAL CENTER | Age: 75
End: 2020-07-21
Attending: FAMILY MEDICINE
Payer: MEDICARE

## 2020-07-21 DIAGNOSIS — Z12.31 VISIT FOR SCREENING MAMMOGRAM: ICD-10-CM

## 2020-07-21 PROCEDURE — 77067 SCR MAMMO BI INCL CAD: CPT

## 2020-10-23 ENCOUNTER — HOSPITAL ENCOUNTER (OUTPATIENT)
Dept: LAB | Facility: MEDICAL CENTER | Age: 75
End: 2020-10-23
Attending: FAMILY MEDICINE
Payer: MEDICARE

## 2020-10-23 PROCEDURE — U0003 INFECTIOUS AGENT DETECTION BY NUCLEIC ACID (DNA OR RNA); SEVERE ACUTE RESPIRATORY SYNDROME CORONAVIRUS 2 (SARS-COV-2) (CORONAVIRUS DISEASE [COVID-19]), AMPLIFIED PROBE TECHNIQUE, MAKING USE OF HIGH THROUGHPUT TECHNOLOGIES AS DESCRIBED BY CMS-2020-01-R: HCPCS

## 2020-10-23 PROCEDURE — C9803 HOPD COVID-19 SPEC COLLECT: HCPCS

## 2020-10-24 LAB
COVID ORDER STATUS COVID19: NORMAL
SARS-COV-2 RNA RESP QL NAA+PROBE: NOTDETECTED
SPECIMEN SOURCE: NORMAL

## 2021-01-11 DIAGNOSIS — Z23 NEED FOR VACCINATION: ICD-10-CM

## 2021-01-22 ENCOUNTER — IMMUNIZATION (OUTPATIENT)
Dept: FAMILY PLANNING/WOMEN'S HEALTH CLINIC | Facility: IMMUNIZATION CENTER | Age: 76
End: 2021-01-22
Attending: INTERNAL MEDICINE
Payer: MEDICARE

## 2021-01-22 DIAGNOSIS — Z23 NEED FOR VACCINATION: ICD-10-CM

## 2021-01-22 DIAGNOSIS — Z23 ENCOUNTER FOR VACCINATION: Primary | ICD-10-CM

## 2021-01-22 PROCEDURE — 91300 PFIZER SARS-COV-2 VACCINE: CPT | Performed by: INTERNAL MEDICINE

## 2021-01-22 PROCEDURE — 0001A PFIZER SARS-COV-2 VACCINE: CPT | Performed by: INTERNAL MEDICINE

## 2021-01-25 ENCOUNTER — HOSPITAL ENCOUNTER (OUTPATIENT)
Dept: RADIOLOGY | Facility: MEDICAL CENTER | Age: 76
End: 2021-01-25
Attending: FAMILY MEDICINE
Payer: MEDICARE

## 2021-01-25 DIAGNOSIS — M81.0 SENILE OSTEOPOROSIS: ICD-10-CM

## 2021-01-25 PROCEDURE — 77080 DXA BONE DENSITY AXIAL: CPT

## 2021-02-12 ENCOUNTER — IMMUNIZATION (OUTPATIENT)
Dept: FAMILY PLANNING/WOMEN'S HEALTH CLINIC | Facility: IMMUNIZATION CENTER | Age: 76
End: 2021-02-12
Attending: INTERNAL MEDICINE
Payer: MEDICARE

## 2021-02-12 DIAGNOSIS — Z23 ENCOUNTER FOR VACCINATION: Primary | ICD-10-CM

## 2021-02-12 PROCEDURE — 0002A PFIZER SARS-COV-2 VACCINE: CPT

## 2021-02-12 PROCEDURE — 91300 PFIZER SARS-COV-2 VACCINE: CPT

## 2021-02-17 ENCOUNTER — HOSPITAL ENCOUNTER (OUTPATIENT)
Dept: LAB | Facility: MEDICAL CENTER | Age: 76
End: 2021-02-17
Attending: FAMILY MEDICINE
Payer: MEDICARE

## 2021-02-17 LAB
25(OH)D3 SERPL-MCNC: 61 NG/ML (ref 30–100)
ALBUMIN SERPL BCP-MCNC: 4.1 G/DL (ref 3.2–4.9)
ALBUMIN/GLOB SERPL: 1.6 G/DL
ALP SERPL-CCNC: 68 U/L (ref 30–99)
ALT SERPL-CCNC: 30 U/L (ref 2–50)
ANION GAP SERPL CALC-SCNC: 12 MMOL/L (ref 7–16)
APPEARANCE UR: CLEAR
AST SERPL-CCNC: 35 U/L (ref 12–45)
BACTERIA #/AREA URNS HPF: NEGATIVE /HPF
BASOPHILS # BLD AUTO: 0.7 % (ref 0–1.8)
BASOPHILS # BLD: 0.04 K/UL (ref 0–0.12)
BILIRUB SERPL-MCNC: 0.4 MG/DL (ref 0.1–1.5)
BILIRUB UR QL STRIP.AUTO: NEGATIVE
BUN SERPL-MCNC: 9 MG/DL (ref 8–22)
CALCIUM SERPL-MCNC: 9.3 MG/DL (ref 8.5–10.5)
CHLORIDE SERPL-SCNC: 102 MMOL/L (ref 96–112)
CHOLEST SERPL-MCNC: 165 MG/DL (ref 100–199)
CO2 SERPL-SCNC: 27 MMOL/L (ref 20–33)
COLOR UR: YELLOW
CREAT SERPL-MCNC: 0.63 MG/DL (ref 0.5–1.4)
EOSINOPHIL # BLD AUTO: 0.08 K/UL (ref 0–0.51)
EOSINOPHIL NFR BLD: 1.4 % (ref 0–6.9)
EPI CELLS #/AREA URNS HPF: NEGATIVE /HPF
ERYTHROCYTE [DISTWIDTH] IN BLOOD BY AUTOMATED COUNT: 48.6 FL (ref 35.9–50)
FASTING STATUS PATIENT QL REPORTED: NORMAL
GLOBULIN SER CALC-MCNC: 2.6 G/DL (ref 1.9–3.5)
GLUCOSE SERPL-MCNC: 95 MG/DL (ref 65–99)
GLUCOSE UR STRIP.AUTO-MCNC: NEGATIVE MG/DL
HCT VFR BLD AUTO: 43.8 % (ref 37–47)
HDLC SERPL-MCNC: 79 MG/DL
HGB BLD-MCNC: 14.1 G/DL (ref 12–16)
HYALINE CASTS #/AREA URNS LPF: NORMAL /LPF
IMM GRANULOCYTES # BLD AUTO: 0.01 K/UL (ref 0–0.11)
IMM GRANULOCYTES NFR BLD AUTO: 0.2 % (ref 0–0.9)
KETONES UR STRIP.AUTO-MCNC: NEGATIVE MG/DL
LDLC SERPL CALC-MCNC: 72 MG/DL
LEUKOCYTE ESTERASE UR QL STRIP.AUTO: NEGATIVE
LYMPHOCYTES # BLD AUTO: 2.41 K/UL (ref 1–4.8)
LYMPHOCYTES NFR BLD: 43.5 % (ref 22–41)
MCH RBC QN AUTO: 32.3 PG (ref 27–33)
MCHC RBC AUTO-ENTMCNC: 32.2 G/DL (ref 33.6–35)
MCV RBC AUTO: 100.5 FL (ref 81.4–97.8)
MICRO URNS: ABNORMAL
MONOCYTES # BLD AUTO: 0.76 K/UL (ref 0–0.85)
MONOCYTES NFR BLD AUTO: 13.7 % (ref 0–13.4)
NEUTROPHILS # BLD AUTO: 2.24 K/UL (ref 2–7.15)
NEUTROPHILS NFR BLD: 40.5 % (ref 44–72)
NITRITE UR QL STRIP.AUTO: NEGATIVE
NRBC # BLD AUTO: 0 K/UL
NRBC BLD-RTO: 0 /100 WBC
PH UR STRIP.AUTO: 7.5 [PH] (ref 5–8)
PLATELET # BLD AUTO: 285 K/UL (ref 164–446)
PMV BLD AUTO: 9.9 FL (ref 9–12.9)
POTASSIUM SERPL-SCNC: 4.3 MMOL/L (ref 3.6–5.5)
PROT SERPL-MCNC: 6.7 G/DL (ref 6–8.2)
PROT UR QL STRIP: NEGATIVE MG/DL
RBC # BLD AUTO: 4.36 M/UL (ref 4.2–5.4)
RBC # URNS HPF: NORMAL /HPF
RBC UR QL AUTO: ABNORMAL
SODIUM SERPL-SCNC: 141 MMOL/L (ref 135–145)
SP GR UR STRIP.AUTO: 1.02
TRI-PHOS CRY #/AREA URNS HPF: NORMAL /HPF
TRIGL SERPL-MCNC: 68 MG/DL (ref 0–149)
TSH SERPL DL<=0.005 MIU/L-ACNC: 2.63 UIU/ML (ref 0.38–5.33)
UROBILINOGEN UR STRIP.AUTO-MCNC: 0.2 MG/DL
WBC # BLD AUTO: 5.5 K/UL (ref 4.8–10.8)
WBC #/AREA URNS HPF: NORMAL /HPF

## 2021-02-17 PROCEDURE — 81001 URINALYSIS AUTO W/SCOPE: CPT

## 2021-02-17 PROCEDURE — 82306 VITAMIN D 25 HYDROXY: CPT

## 2021-02-17 PROCEDURE — 80061 LIPID PANEL: CPT

## 2021-02-17 PROCEDURE — 36415 COLL VENOUS BLD VENIPUNCTURE: CPT

## 2021-02-17 PROCEDURE — 85025 COMPLETE CBC W/AUTO DIFF WBC: CPT

## 2021-02-17 PROCEDURE — 80053 COMPREHEN METABOLIC PANEL: CPT

## 2021-02-17 PROCEDURE — 84443 ASSAY THYROID STIM HORMONE: CPT

## 2021-04-28 ENCOUNTER — APPOINTMENT (RX ONLY)
Dept: URBAN - METROPOLITAN AREA CLINIC 4 | Facility: CLINIC | Age: 76
Setting detail: DERMATOLOGY
End: 2021-04-28

## 2021-04-28 DIAGNOSIS — L82.1 OTHER SEBORRHEIC KERATOSIS: ICD-10-CM

## 2021-04-28 DIAGNOSIS — L81.4 OTHER MELANIN HYPERPIGMENTATION: ICD-10-CM

## 2021-04-28 DIAGNOSIS — D18.0 HEMANGIOMA: ICD-10-CM

## 2021-04-28 DIAGNOSIS — L57.8 OTHER SKIN CHANGES DUE TO CHRONIC EXPOSURE TO NONIONIZING RADIATION: ICD-10-CM

## 2021-04-28 PROBLEM — D18.01 HEMANGIOMA OF SKIN AND SUBCUTANEOUS TISSUE: Status: ACTIVE | Noted: 2021-04-28

## 2021-04-28 PROCEDURE — 99213 OFFICE O/P EST LOW 20 MIN: CPT

## 2021-04-28 PROCEDURE — ? LIQUID NITROGEN (COSMETIC)

## 2021-04-28 PROCEDURE — ? COUNSELING

## 2021-04-28 ASSESSMENT — LOCATION SIMPLE DESCRIPTION DERM
LOCATION SIMPLE: UPPER BACK
LOCATION SIMPLE: RIGHT UPPER BACK
LOCATION SIMPLE: RIGHT POSTERIOR THIGH
LOCATION SIMPLE: RIGHT FOREARM
LOCATION SIMPLE: LEFT FOREARM
LOCATION SIMPLE: LEFT FOREHEAD
LOCATION SIMPLE: LEFT POSTERIOR THIGH
LOCATION SIMPLE: LEFT BREAST
LOCATION SIMPLE: RIGHT UPPER ARM
LOCATION SIMPLE: RIGHT FOREHEAD
LOCATION SIMPLE: LEFT UPPER ARM
LOCATION SIMPLE: LEFT CHEEK

## 2021-04-28 ASSESSMENT — LOCATION ZONE DERM
LOCATION ZONE: ARM
LOCATION ZONE: FACE
LOCATION ZONE: LEG
LOCATION ZONE: TRUNK

## 2021-04-28 ASSESSMENT — LOCATION DETAILED DESCRIPTION DERM
LOCATION DETAILED: RIGHT PROXIMAL RADIAL DORSAL FOREARM
LOCATION DETAILED: INFERIOR THORACIC SPINE
LOCATION DETAILED: RIGHT MID-UPPER BACK
LOCATION DETAILED: LEFT DISTAL POSTERIOR THIGH
LOCATION DETAILED: RIGHT ANTERIOR DISTAL UPPER ARM
LOCATION DETAILED: LEFT MEDIAL FOREHEAD
LOCATION DETAILED: LEFT CENTRAL MALAR CHEEK
LOCATION DETAILED: RIGHT DISTAL POSTERIOR THIGH
LOCATION DETAILED: RIGHT FOREHEAD
LOCATION DETAILED: LEFT PROXIMAL DORSAL FOREARM
LOCATION DETAILED: LEFT MEDIAL BREAST 8-9:00 REGION
LOCATION DETAILED: LEFT ANTERIOR DISTAL UPPER ARM

## 2021-04-28 NOTE — HPI: SKIN LESION
Is This A New Presentation, Or A Follow-Up?: Skin Lesion
What Type Of Note Output Would You Prefer (Optional)?: Bullet Format
How Severe Is Your Skin Lesion?: mild
Has Your Skin Lesion Been Treated?: not been treated
Which Family Member (Optional)?: Sister

## 2021-04-28 NOTE — PROCEDURE: LIQUID NITROGEN (COSMETIC)
Consent: The patient's consent was obtained including but not limited to risks of crusting, scabbing, blistering, scarring, darker or lighter pigmentary change, recurrence, incomplete removal and infection. The patient understands that the procedure is cosmetic in nature and is not covered by insurance.
Detail Level: Detailed
Price (Use Numbers Only, No Special Characters Or $): 0
Billing Information: Bill by Static Price
Render Post-Care Instructions In Note?: no
Post-Care Instructions: I reviewed with the patient in detail post-care instructions. Patient is to wear sunprotection, and avoid picking at any of the treated lesions. Pt may apply Vaseline to crusted or scabbing areas.

## 2021-07-22 ENCOUNTER — HOSPITAL ENCOUNTER (OUTPATIENT)
Dept: RADIOLOGY | Facility: MEDICAL CENTER | Age: 76
End: 2021-07-22
Attending: FAMILY MEDICINE
Payer: MEDICARE

## 2021-07-22 DIAGNOSIS — Z12.31 VISIT FOR SCREENING MAMMOGRAM: ICD-10-CM

## 2021-07-22 PROCEDURE — 77063 BREAST TOMOSYNTHESIS BI: CPT

## 2021-08-09 ENCOUNTER — PATIENT MESSAGE (OUTPATIENT)
Dept: HEALTH INFORMATION MANAGEMENT | Facility: OTHER | Age: 76
End: 2021-08-09

## 2021-11-08 ENCOUNTER — APPOINTMENT (RX ONLY)
Dept: URBAN - METROPOLITAN AREA CLINIC 4 | Facility: CLINIC | Age: 76
Setting detail: DERMATOLOGY
End: 2021-11-08

## 2021-11-08 DIAGNOSIS — L81.4 OTHER MELANIN HYPERPIGMENTATION: ICD-10-CM

## 2021-11-08 DIAGNOSIS — L82.1 OTHER SEBORRHEIC KERATOSIS: ICD-10-CM

## 2021-11-08 PROCEDURE — ? LIQUID NITROGEN (COSMETIC)

## 2021-11-08 PROCEDURE — 99212 OFFICE O/P EST SF 10 MIN: CPT

## 2021-11-08 PROCEDURE — ? COUNSELING

## 2021-11-08 ASSESSMENT — LOCATION ZONE DERM
LOCATION ZONE: LEG
LOCATION ZONE: FACE

## 2021-11-08 ASSESSMENT — LOCATION DETAILED DESCRIPTION DERM
LOCATION DETAILED: SUPERIOR MID FOREHEAD
LOCATION DETAILED: LEFT SUPERIOR MEDIAL FOREHEAD
LOCATION DETAILED: LEFT ANTERIOR PROXIMAL THIGH

## 2021-11-08 ASSESSMENT — LOCATION SIMPLE DESCRIPTION DERM
LOCATION SIMPLE: LEFT FOREHEAD
LOCATION SIMPLE: SUPERIOR FOREHEAD
LOCATION SIMPLE: LEFT THIGH

## 2021-12-03 ENCOUNTER — TELEPHONE (OUTPATIENT)
Dept: HEALTH INFORMATION MANAGEMENT | Facility: OTHER | Age: 76
End: 2021-12-03

## 2021-12-08 PROBLEM — M81.0 OSTEOPOROSIS: Status: ACTIVE | Noted: 2021-12-08

## 2022-01-14 ENCOUNTER — HOSPITAL ENCOUNTER (OUTPATIENT)
Dept: LAB | Facility: MEDICAL CENTER | Age: 77
End: 2022-01-14
Attending: FAMILY MEDICINE
Payer: MEDICARE

## 2022-01-14 LAB
25(OH)D3 SERPL-MCNC: 71 NG/ML (ref 30–100)
ALBUMIN SERPL BCP-MCNC: 4.3 G/DL (ref 3.2–4.9)
ALBUMIN/GLOB SERPL: 1.8 G/DL
ALP SERPL-CCNC: 58 U/L (ref 30–99)
ALT SERPL-CCNC: 25 U/L (ref 2–50)
ANION GAP SERPL CALC-SCNC: 11 MMOL/L (ref 7–16)
APPEARANCE UR: CLEAR
AST SERPL-CCNC: 28 U/L (ref 12–45)
BACTERIA #/AREA URNS HPF: NEGATIVE /HPF
BASOPHILS # BLD AUTO: 0.7 % (ref 0–1.8)
BASOPHILS # BLD: 0.04 K/UL (ref 0–0.12)
BILIRUB SERPL-MCNC: 0.5 MG/DL (ref 0.1–1.5)
BILIRUB UR QL STRIP.AUTO: NEGATIVE
BUN SERPL-MCNC: 16 MG/DL (ref 8–22)
CALCIUM SERPL-MCNC: 9.1 MG/DL (ref 8.5–10.5)
CHLORIDE SERPL-SCNC: 105 MMOL/L (ref 96–112)
CHOLEST SERPL-MCNC: 178 MG/DL (ref 100–199)
CO2 SERPL-SCNC: 25 MMOL/L (ref 20–33)
COLOR UR: YELLOW
CREAT SERPL-MCNC: 0.58 MG/DL (ref 0.5–1.4)
EOSINOPHIL # BLD AUTO: 0.04 K/UL (ref 0–0.51)
EOSINOPHIL NFR BLD: 0.7 % (ref 0–6.9)
EPI CELLS #/AREA URNS HPF: NEGATIVE /HPF
ERYTHROCYTE [DISTWIDTH] IN BLOOD BY AUTOMATED COUNT: 49.7 FL (ref 35.9–50)
FASTING STATUS PATIENT QL REPORTED: NORMAL
GLOBULIN SER CALC-MCNC: 2.4 G/DL (ref 1.9–3.5)
GLUCOSE SERPL-MCNC: 97 MG/DL (ref 65–99)
GLUCOSE UR STRIP.AUTO-MCNC: NEGATIVE MG/DL
HCT VFR BLD AUTO: 43.7 % (ref 37–47)
HDLC SERPL-MCNC: 85 MG/DL
HGB BLD-MCNC: 14 G/DL (ref 12–16)
HYALINE CASTS #/AREA URNS LPF: ABNORMAL /LPF
IMM GRANULOCYTES # BLD AUTO: 0.01 K/UL (ref 0–0.11)
IMM GRANULOCYTES NFR BLD AUTO: 0.2 % (ref 0–0.9)
KETONES UR STRIP.AUTO-MCNC: NEGATIVE MG/DL
LDLC SERPL CALC-MCNC: 83 MG/DL
LEUKOCYTE ESTERASE UR QL STRIP.AUTO: NEGATIVE
LYMPHOCYTES # BLD AUTO: 2.43 K/UL (ref 1–4.8)
LYMPHOCYTES NFR BLD: 39.6 % (ref 22–41)
MCH RBC QN AUTO: 32 PG (ref 27–33)
MCHC RBC AUTO-ENTMCNC: 32 G/DL (ref 33.6–35)
MCV RBC AUTO: 100 FL (ref 81.4–97.8)
MICRO URNS: ABNORMAL
MONOCYTES # BLD AUTO: 0.8 K/UL (ref 0–0.85)
MONOCYTES NFR BLD AUTO: 13 % (ref 0–13.4)
NEUTROPHILS # BLD AUTO: 2.82 K/UL (ref 2–7.15)
NEUTROPHILS NFR BLD: 45.8 % (ref 44–72)
NITRITE UR QL STRIP.AUTO: NEGATIVE
NRBC # BLD AUTO: 0 K/UL
NRBC BLD-RTO: 0 /100 WBC
PH UR STRIP.AUTO: 6 [PH] (ref 5–8)
PLATELET # BLD AUTO: 317 K/UL (ref 164–446)
PMV BLD AUTO: 10.1 FL (ref 9–12.9)
POTASSIUM SERPL-SCNC: 4.2 MMOL/L (ref 3.6–5.5)
PROT SERPL-MCNC: 6.7 G/DL (ref 6–8.2)
PROT UR QL STRIP: NEGATIVE MG/DL
RBC # BLD AUTO: 4.37 M/UL (ref 4.2–5.4)
RBC # URNS HPF: ABNORMAL /HPF
RBC UR QL AUTO: ABNORMAL
SODIUM SERPL-SCNC: 141 MMOL/L (ref 135–145)
SP GR UR STRIP.AUTO: 1.02
TRIGL SERPL-MCNC: 50 MG/DL (ref 0–149)
TSH SERPL DL<=0.005 MIU/L-ACNC: 2.55 UIU/ML (ref 0.38–5.33)
UROBILINOGEN UR STRIP.AUTO-MCNC: 0.2 MG/DL
WBC # BLD AUTO: 6.1 K/UL (ref 4.8–10.8)
WBC #/AREA URNS HPF: ABNORMAL /HPF

## 2022-01-14 PROCEDURE — 82306 VITAMIN D 25 HYDROXY: CPT

## 2022-01-14 PROCEDURE — 36415 COLL VENOUS BLD VENIPUNCTURE: CPT

## 2022-01-14 PROCEDURE — 80061 LIPID PANEL: CPT

## 2022-01-14 PROCEDURE — 80053 COMPREHEN METABOLIC PANEL: CPT

## 2022-01-14 PROCEDURE — 81001 URINALYSIS AUTO W/SCOPE: CPT

## 2022-01-14 PROCEDURE — 85025 COMPLETE CBC W/AUTO DIFF WBC: CPT

## 2022-01-14 PROCEDURE — 84443 ASSAY THYROID STIM HORMONE: CPT

## 2022-04-11 ENCOUNTER — HOSPITAL ENCOUNTER (OUTPATIENT)
Facility: MEDICAL CENTER | Age: 77
End: 2022-04-11
Attending: FAMILY MEDICINE
Payer: MEDICARE

## 2022-04-11 PROCEDURE — 81001 URINALYSIS AUTO W/SCOPE: CPT

## 2022-04-12 LAB
APPEARANCE UR: CLEAR
BACTERIA #/AREA URNS HPF: NEGATIVE /HPF
BILIRUB UR QL STRIP.AUTO: NEGATIVE
COLOR UR: YELLOW
EPI CELLS #/AREA URNS HPF: NEGATIVE /HPF
GLUCOSE UR STRIP.AUTO-MCNC: NEGATIVE MG/DL
HYALINE CASTS #/AREA URNS LPF: NORMAL /LPF
KETONES UR STRIP.AUTO-MCNC: NEGATIVE MG/DL
LEUKOCYTE ESTERASE UR QL STRIP.AUTO: ABNORMAL
MICRO URNS: ABNORMAL
NITRITE UR QL STRIP.AUTO: NEGATIVE
PH UR STRIP.AUTO: 5.5 [PH] (ref 5–8)
PROT UR QL STRIP: NEGATIVE MG/DL
RBC # URNS HPF: NORMAL /HPF
RBC UR QL AUTO: NEGATIVE
SP GR UR STRIP.AUTO: 1.01
UROBILINOGEN UR STRIP.AUTO-MCNC: 0.2 MG/DL
WBC #/AREA URNS HPF: NORMAL /HPF

## 2022-04-27 ENCOUNTER — HOSPITAL ENCOUNTER (OUTPATIENT)
Dept: RADIOLOGY | Facility: MEDICAL CENTER | Age: 77
End: 2022-04-27
Attending: FAMILY MEDICINE
Payer: MEDICARE

## 2022-04-27 DIAGNOSIS — R10.31 ABDOMINAL PAIN, RIGHT LOWER QUADRANT: ICD-10-CM

## 2022-04-27 PROCEDURE — 76830 TRANSVAGINAL US NON-OB: CPT

## 2022-04-28 ENCOUNTER — PHARMACY VISIT (OUTPATIENT)
Dept: PHARMACY | Facility: MEDICAL CENTER | Age: 77
End: 2022-04-28
Payer: COMMERCIAL

## 2022-04-28 PROCEDURE — RXMED WILLOW AMBULATORY MEDICATION CHARGE: Performed by: INTERNAL MEDICINE

## 2022-04-28 RX ORDER — BNT162B2 0.23 MG/2.25ML
INJECTION, SUSPENSION INTRAMUSCULAR
Qty: 0.3 ML | Refills: 0 | Status: SHIPPED | OUTPATIENT
Start: 2022-04-28 | End: 2023-12-06

## 2022-06-08 ENCOUNTER — HOSPITAL ENCOUNTER (OUTPATIENT)
Dept: RADIOLOGY | Facility: MEDICAL CENTER | Age: 77
End: 2022-06-08
Attending: PODIATRIST
Payer: MEDICARE

## 2022-06-08 DIAGNOSIS — D21.22: ICD-10-CM

## 2022-06-08 PROCEDURE — 73720 MRI LWR EXTREMITY W/O&W/DYE: CPT | Mod: LT

## 2022-06-08 PROCEDURE — 700117 HCHG RX CONTRAST REV CODE 255: Performed by: PODIATRIST

## 2022-06-08 PROCEDURE — A9576 INJ PROHANCE MULTIPACK: HCPCS | Performed by: PODIATRIST

## 2022-06-08 RX ADMIN — GADOTERIDOL 10 ML: 279.3 INJECTION, SOLUTION INTRAVENOUS at 16:20

## 2022-07-28 ENCOUNTER — HOSPITAL ENCOUNTER (OUTPATIENT)
Dept: RADIOLOGY | Facility: MEDICAL CENTER | Age: 77
End: 2022-07-28
Attending: FAMILY MEDICINE
Payer: MEDICARE

## 2022-07-28 DIAGNOSIS — Z12.31 VISIT FOR SCREENING MAMMOGRAM: ICD-10-CM

## 2022-07-28 PROCEDURE — 77063 BREAST TOMOSYNTHESIS BI: CPT

## 2022-09-21 ENCOUNTER — HOSPITAL ENCOUNTER (OUTPATIENT)
Facility: MEDICAL CENTER | Age: 77
End: 2022-09-21
Attending: FAMILY MEDICINE
Payer: MEDICARE

## 2022-09-21 LAB
APPEARANCE UR: CLEAR
BILIRUB UR QL STRIP.AUTO: NEGATIVE
COLOR UR: YELLOW
GLUCOSE UR STRIP.AUTO-MCNC: NEGATIVE MG/DL
KETONES UR STRIP.AUTO-MCNC: NEGATIVE MG/DL
LEUKOCYTE ESTERASE UR QL STRIP.AUTO: NEGATIVE
MICRO URNS: NORMAL
NITRITE UR QL STRIP.AUTO: NEGATIVE
PH UR STRIP.AUTO: 5.5 [PH] (ref 5–8)
PROT UR QL STRIP: NEGATIVE MG/DL
RBC UR QL AUTO: NEGATIVE
SP GR UR STRIP.AUTO: 1.01
UROBILINOGEN UR STRIP.AUTO-MCNC: 0.2 MG/DL

## 2022-09-21 PROCEDURE — 81003 URINALYSIS AUTO W/O SCOPE: CPT

## 2022-11-01 PROBLEM — M81.0 OSTEOPOROSIS: Status: ACTIVE | Noted: 2022-11-01

## 2022-11-09 ENCOUNTER — HOSPITAL ENCOUNTER (OUTPATIENT)
Dept: RADIOLOGY | Facility: MEDICAL CENTER | Age: 77
End: 2022-11-09
Attending: FAMILY MEDICINE
Payer: MEDICARE

## 2022-11-09 DIAGNOSIS — R10.11 RIGHT UPPER QUADRANT PAIN: ICD-10-CM

## 2022-11-09 PROCEDURE — 76700 US EXAM ABDOM COMPLETE: CPT

## 2022-11-18 ENCOUNTER — TELEPHONE (OUTPATIENT)
Dept: ONCOLOGY | Facility: MEDICAL CENTER | Age: 77
End: 2022-11-18
Payer: MEDICARE

## 2022-11-18 NOTE — TELEPHONE ENCOUNTER
Advised patient we do not have her order for Prolia but that I would call Dr. Harden's office to request the order.    LVM on RUPERT Bermeo's vm requesting order and providing our phone and fax number.

## 2022-12-05 ENCOUNTER — HOSPITAL ENCOUNTER (OUTPATIENT)
Dept: LAB | Facility: MEDICAL CENTER | Age: 77
End: 2022-12-05
Attending: FAMILY MEDICINE
Payer: MEDICARE

## 2022-12-05 LAB
25(OH)D3 SERPL-MCNC: 103 NG/ML (ref 30–100)
ALBUMIN SERPL BCP-MCNC: 4.3 G/DL (ref 3.2–4.9)
ALBUMIN/GLOB SERPL: 1.9 G/DL
ALP SERPL-CCNC: 56 U/L (ref 30–99)
ALT SERPL-CCNC: 27 U/L (ref 2–50)
ANION GAP SERPL CALC-SCNC: 10 MMOL/L (ref 7–16)
APPEARANCE UR: CLEAR
AST SERPL-CCNC: 33 U/L (ref 12–45)
BACTERIA #/AREA URNS HPF: ABNORMAL /HPF
BASOPHILS # BLD AUTO: 0.7 % (ref 0–1.8)
BASOPHILS # BLD: 0.04 K/UL (ref 0–0.12)
BILIRUB SERPL-MCNC: 0.5 MG/DL (ref 0.1–1.5)
BILIRUB UR QL STRIP.AUTO: NEGATIVE
BUN SERPL-MCNC: 11 MG/DL (ref 8–22)
CALCIUM SERPL-MCNC: 9.2 MG/DL (ref 8.5–10.5)
CHLORIDE SERPL-SCNC: 106 MMOL/L (ref 96–112)
CHOLEST SERPL-MCNC: 183 MG/DL (ref 100–199)
CO2 SERPL-SCNC: 25 MMOL/L (ref 20–33)
COLOR UR: YELLOW
CREAT SERPL-MCNC: 0.6 MG/DL (ref 0.5–1.4)
EOSINOPHIL # BLD AUTO: 0.1 K/UL (ref 0–0.51)
EOSINOPHIL NFR BLD: 1.8 % (ref 0–6.9)
EPI CELLS #/AREA URNS HPF: ABNORMAL /HPF
ERYTHROCYTE [DISTWIDTH] IN BLOOD BY AUTOMATED COUNT: 51.8 FL (ref 35.9–50)
EST. AVERAGE GLUCOSE BLD GHB EST-MCNC: 120 MG/DL
FASTING STATUS PATIENT QL REPORTED: NORMAL
GFR SERPLBLD CREATININE-BSD FMLA CKD-EPI: 91 ML/MIN/1.73 M 2
GLOBULIN SER CALC-MCNC: 2.3 G/DL (ref 1.9–3.5)
GLUCOSE SERPL-MCNC: 99 MG/DL (ref 65–99)
GLUCOSE UR STRIP.AUTO-MCNC: NEGATIVE MG/DL
HBA1C MFR BLD: 5.8 % (ref 4–5.6)
HCT VFR BLD AUTO: 42.7 % (ref 37–47)
HDLC SERPL-MCNC: 95 MG/DL
HGB BLD-MCNC: 13.7 G/DL (ref 12–16)
HYALINE CASTS #/AREA URNS LPF: ABNORMAL /LPF
IMM GRANULOCYTES # BLD AUTO: 0.01 K/UL (ref 0–0.11)
IMM GRANULOCYTES NFR BLD AUTO: 0.2 % (ref 0–0.9)
KETONES UR STRIP.AUTO-MCNC: NEGATIVE MG/DL
LDLC SERPL CALC-MCNC: 77 MG/DL
LEUKOCYTE ESTERASE UR QL STRIP.AUTO: ABNORMAL
LYMPHOCYTES # BLD AUTO: 2.23 K/UL (ref 1–4.8)
LYMPHOCYTES NFR BLD: 39.5 % (ref 22–41)
MCH RBC QN AUTO: 32.3 PG (ref 27–33)
MCHC RBC AUTO-ENTMCNC: 32.1 G/DL (ref 33.6–35)
MCV RBC AUTO: 100.7 FL (ref 81.4–97.8)
MICRO URNS: ABNORMAL
MONOCYTES # BLD AUTO: 0.78 K/UL (ref 0–0.85)
MONOCYTES NFR BLD AUTO: 13.8 % (ref 0–13.4)
NEUTROPHILS # BLD AUTO: 2.48 K/UL (ref 2–7.15)
NEUTROPHILS NFR BLD: 44 % (ref 44–72)
NITRITE UR QL STRIP.AUTO: NEGATIVE
NRBC # BLD AUTO: 0 K/UL
NRBC BLD-RTO: 0 /100 WBC
PH UR STRIP.AUTO: 7 [PH] (ref 5–8)
PLATELET # BLD AUTO: 283 K/UL (ref 164–446)
PMV BLD AUTO: 10.2 FL (ref 9–12.9)
POTASSIUM SERPL-SCNC: 3.9 MMOL/L (ref 3.6–5.5)
PROT SERPL-MCNC: 6.6 G/DL (ref 6–8.2)
PROT UR QL STRIP: NEGATIVE MG/DL
RBC # BLD AUTO: 4.24 M/UL (ref 4.2–5.4)
RBC # URNS HPF: ABNORMAL /HPF
RBC UR QL AUTO: ABNORMAL
SODIUM SERPL-SCNC: 141 MMOL/L (ref 135–145)
SP GR UR STRIP.AUTO: 1.02
TRIGL SERPL-MCNC: 56 MG/DL (ref 0–149)
TSH SERPL DL<=0.005 MIU/L-ACNC: 3.41 UIU/ML (ref 0.38–5.33)
UROBILINOGEN UR STRIP.AUTO-MCNC: 0.2 MG/DL
WBC # BLD AUTO: 5.6 K/UL (ref 4.8–10.8)
WBC #/AREA URNS HPF: ABNORMAL /HPF

## 2022-12-05 PROCEDURE — 80053 COMPREHEN METABOLIC PANEL: CPT

## 2022-12-05 PROCEDURE — 36415 COLL VENOUS BLD VENIPUNCTURE: CPT

## 2022-12-05 PROCEDURE — 83036 HEMOGLOBIN GLYCOSYLATED A1C: CPT

## 2022-12-05 PROCEDURE — 82306 VITAMIN D 25 HYDROXY: CPT

## 2022-12-05 PROCEDURE — 80061 LIPID PANEL: CPT

## 2022-12-05 PROCEDURE — 84443 ASSAY THYROID STIM HORMONE: CPT

## 2022-12-05 PROCEDURE — 85025 COMPLETE CBC W/AUTO DIFF WBC: CPT

## 2022-12-05 PROCEDURE — 81001 URINALYSIS AUTO W/SCOPE: CPT

## 2022-12-13 ENCOUNTER — OUTPATIENT INFUSION SERVICES (OUTPATIENT)
Dept: ONCOLOGY | Facility: MEDICAL CENTER | Age: 77
End: 2022-12-13
Attending: FAMILY MEDICINE
Payer: MEDICARE

## 2022-12-13 VITALS
TEMPERATURE: 97 F | DIASTOLIC BLOOD PRESSURE: 78 MMHG | HEIGHT: 59 IN | BODY MASS INDEX: 23.56 KG/M2 | WEIGHT: 116.84 LBS | SYSTOLIC BLOOD PRESSURE: 136 MMHG | RESPIRATION RATE: 18 BRPM | OXYGEN SATURATION: 98 % | HEART RATE: 64 BPM

## 2022-12-13 DIAGNOSIS — M81.0 AGE RELATED OSTEOPOROSIS, UNSPECIFIED PATHOLOGICAL FRACTURE PRESENCE: ICD-10-CM

## 2022-12-13 PROCEDURE — 96372 THER/PROPH/DIAG INJ SC/IM: CPT

## 2022-12-13 PROCEDURE — 700111 HCHG RX REV CODE 636 W/ 250 OVERRIDE (IP): Performed by: FAMILY MEDICINE

## 2022-12-13 RX ORDER — CELECOXIB 200 MG/1
CAPSULE ORAL
COMMUNITY
Start: 2022-10-28

## 2022-12-13 RX ADMIN — DENOSUMAB 60 MG: 60 INJECTION SUBCUTANEOUS at 15:49

## 2022-12-13 ASSESSMENT — FIBROSIS 4 INDEX: FIB4 SCORE: 1.77

## 2022-12-14 NOTE — PROGRESS NOTES
Estrella arrived ambulatory for Q 6 month Prolia injection. She denies any s/s acute infection or illness, denies dental procedures in the past 4 weeks or upcoming dental procedures, denies s/s of hypocalcimia.  Pt confirms taking calcuim/vitamin D at home. Labs drawn 12/05/2022 reviewed, parameters met.  Prolia injected Sub Q to back left arm per MAR, bandaid applied to site.  Estrella tolerated well, discharged in no apparent distress, next appointment scheduled and confirmed.

## 2023-06-20 ENCOUNTER — OUTPATIENT INFUSION SERVICES (OUTPATIENT)
Dept: ONCOLOGY | Facility: MEDICAL CENTER | Age: 78
End: 2023-06-20
Attending: FAMILY MEDICINE
Payer: MEDICARE

## 2023-06-20 VITALS
DIASTOLIC BLOOD PRESSURE: 64 MMHG | HEIGHT: 59 IN | OXYGEN SATURATION: 97 % | HEART RATE: 41 BPM | BODY MASS INDEX: 23.69 KG/M2 | WEIGHT: 117.5 LBS | SYSTOLIC BLOOD PRESSURE: 153 MMHG | RESPIRATION RATE: 16 BRPM | TEMPERATURE: 97.9 F

## 2023-06-20 DIAGNOSIS — M81.0 AGE RELATED OSTEOPOROSIS, UNSPECIFIED PATHOLOGICAL FRACTURE PRESENCE: ICD-10-CM

## 2023-06-20 LAB
CA-I BLD ISE-SCNC: 1.22 MMOL/L (ref 1.1–1.3)
CREAT BLD-MCNC: 0.8 MG/DL (ref 0.5–1.4)

## 2023-06-20 PROCEDURE — 82330 ASSAY OF CALCIUM: CPT

## 2023-06-20 PROCEDURE — 700111 HCHG RX REV CODE 636 W/ 250 OVERRIDE (IP): Performed by: FAMILY MEDICINE

## 2023-06-20 PROCEDURE — 82565 ASSAY OF CREATININE: CPT

## 2023-06-20 PROCEDURE — 36415 COLL VENOUS BLD VENIPUNCTURE: CPT

## 2023-06-20 PROCEDURE — 96372 THER/PROPH/DIAG INJ SC/IM: CPT

## 2023-06-20 RX ADMIN — DENOSUMAB 60 MG: 60 INJECTION SUBCUTANEOUS at 14:29

## 2023-06-20 ASSESSMENT — FIBROSIS 4 INDEX: FIB4 SCORE: 1.8

## 2023-06-20 NOTE — PROGRESS NOTES
Pt arrived ambulatory to [facility] for Prolia injection. HR in the 40s but pt was asymptomatic. Suggested that pt follow-up with PCP on abnormal finding.    Labs drawn from left AC via 22G butterfly needle and site covered with gauze and coban. Discussed supplementation of calcium and vitamin D. Per pt, she has no plans for any dental procedures in the next couple of months.  Labs reviewed and orders released. Prolia administered in the back left arm and site covered with a band aid.

## 2023-06-27 ENCOUNTER — APPOINTMENT (RX ONLY)
Dept: URBAN - METROPOLITAN AREA CLINIC 4 | Facility: CLINIC | Age: 78
Setting detail: DERMATOLOGY
End: 2023-06-27

## 2023-06-27 DIAGNOSIS — L81.4 OTHER MELANIN HYPERPIGMENTATION: ICD-10-CM

## 2023-06-27 DIAGNOSIS — L82.0 INFLAMED SEBORRHEIC KERATOSIS: ICD-10-CM

## 2023-06-27 DIAGNOSIS — L85.3 XEROSIS CUTIS: ICD-10-CM

## 2023-06-27 DIAGNOSIS — D18.0 HEMANGIOMA: ICD-10-CM

## 2023-06-27 DIAGNOSIS — L30.8 OTHER SPECIFIED DERMATITIS: ICD-10-CM

## 2023-06-27 DIAGNOSIS — L72.8 OTHER FOLLICULAR CYSTS OF THE SKIN AND SUBCUTANEOUS TISSUE: ICD-10-CM

## 2023-06-27 DIAGNOSIS — D22 MELANOCYTIC NEVI: ICD-10-CM

## 2023-06-27 DIAGNOSIS — L82.1 OTHER SEBORRHEIC KERATOSIS: ICD-10-CM

## 2023-06-27 DIAGNOSIS — B07.8 OTHER VIRAL WARTS: ICD-10-CM

## 2023-06-27 PROBLEM — D18.01 HEMANGIOMA OF SKIN AND SUBCUTANEOUS TISSUE: Status: ACTIVE | Noted: 2023-06-27

## 2023-06-27 PROBLEM — D22.5 MELANOCYTIC NEVI OF TRUNK: Status: ACTIVE | Noted: 2023-06-27

## 2023-06-27 PROBLEM — D48.5 NEOPLASM OF UNCERTAIN BEHAVIOR OF SKIN: Status: ACTIVE | Noted: 2023-06-27

## 2023-06-27 PROCEDURE — ? LIQUID NITROGEN

## 2023-06-27 PROCEDURE — ? BIOPSY BY SHAVE METHOD

## 2023-06-27 PROCEDURE — 11102 TANGNTL BX SKIN SINGLE LES: CPT | Mod: 59

## 2023-06-27 PROCEDURE — 99213 OFFICE O/P EST LOW 20 MIN: CPT | Mod: 25

## 2023-06-27 PROCEDURE — 17110 DESTRUCTION B9 LES UP TO 14: CPT

## 2023-06-27 PROCEDURE — ? COUNSELING

## 2023-06-27 ASSESSMENT — LOCATION DETAILED DESCRIPTION DERM
LOCATION DETAILED: RIGHT RIB CAGE
LOCATION DETAILED: INFERIOR THORACIC SPINE
LOCATION DETAILED: RIGHT ANTERIOR SHOULDER
LOCATION DETAILED: RIGHT THENAR EMINENCE
LOCATION DETAILED: LEFT SUPRAPUBIC SKIN
LOCATION DETAILED: RIGHT CENTRAL TEMPLE
LOCATION DETAILED: RIGHT PROXIMAL DORSAL FOREARM
LOCATION DETAILED: LEFT PROXIMAL DORSAL FOREARM
LOCATION DETAILED: LEFT DISTAL PALMAR MIDDLE FINGER
LOCATION DETAILED: LEFT THENAR EMINENCE
LOCATION DETAILED: EPIGASTRIC SKIN

## 2023-06-27 ASSESSMENT — LOCATION ZONE DERM
LOCATION ZONE: TRUNK
LOCATION ZONE: FACE
LOCATION ZONE: ARM
LOCATION ZONE: HAND
LOCATION ZONE: FINGER

## 2023-06-27 ASSESSMENT — LOCATION SIMPLE DESCRIPTION DERM
LOCATION SIMPLE: UPPER BACK
LOCATION SIMPLE: RIGHT TEMPLE
LOCATION SIMPLE: LEFT FOREARM
LOCATION SIMPLE: RIGHT FOREARM
LOCATION SIMPLE: LEFT MIDDLE FINGER
LOCATION SIMPLE: RIGHT SHOULDER
LOCATION SIMPLE: GROIN
LOCATION SIMPLE: RIGHT HAND
LOCATION SIMPLE: ABDOMEN
LOCATION SIMPLE: LEFT HAND

## 2023-06-27 NOTE — PROCEDURE: LIQUID NITROGEN
Medical Necessity Information: It is in your best interest to select a reason for this procedure from the list below. All of these items fulfill various CMS LCD requirements except the new and changing color options.
Show Applicator Variable?: Yes
Spray Paint Text: The liquid nitrogen was applied to the skin utilizing a spray paint frosting technique.
Post-Care Instructions: I reviewed with the patient in detail post-care instructions. Patient is to wear sunprotection, and avoid picking at any of the treated lesions. Pt may apply Vaseline to crusted or scabbing areas.
Detail Level: Detailed
Consent: The patient's consent was obtained including but not limited to risks of crusting, scabbing, blistering, scarring, darker or lighter pigmentary change, recurrence, incomplete removal and infection.
Include Z78.9 (Other Specified Conditions Influencing Health Status) As An Associated Diagnosis?: No
Medical Necessity Clause: This procedure was medically necessary because the lesions that were treated were:

## 2023-06-27 NOTE — PROCEDURE: COUNSELING
Detail Level: Zone
Detail Level: Detailed
Moisturizer Recommendations: Cerave Cream
Detail Level: Generalized
Sunscreen Recommendations: Recommend sunscreen daily, or sun protective clothing.  I recommend a zinc or titanium based sunscreen with a spf of 30 or greater.

## 2023-07-24 ENCOUNTER — HOSPITAL ENCOUNTER (OUTPATIENT)
Dept: LAB | Facility: MEDICAL CENTER | Age: 78
End: 2023-07-24
Attending: FAMILY MEDICINE
Payer: MEDICARE

## 2023-07-24 LAB
25(OH)D3 SERPL-MCNC: 66 NG/ML (ref 30–100)
ALBUMIN SERPL BCP-MCNC: 4.2 G/DL (ref 3.2–4.9)
ALBUMIN/GLOB SERPL: 1.6 G/DL
ALP SERPL-CCNC: 60 U/L (ref 30–99)
ALT SERPL-CCNC: 28 U/L (ref 2–50)
ANION GAP SERPL CALC-SCNC: 9 MMOL/L (ref 7–16)
APPEARANCE UR: CLEAR
AST SERPL-CCNC: 29 U/L (ref 12–45)
BACTERIA #/AREA URNS HPF: NEGATIVE /HPF
BASOPHILS # BLD AUTO: 0.7 % (ref 0–1.8)
BASOPHILS # BLD: 0.04 K/UL (ref 0–0.12)
BILIRUB SERPL-MCNC: 0.3 MG/DL (ref 0.1–1.5)
BILIRUB UR QL STRIP.AUTO: NEGATIVE
BUN SERPL-MCNC: 12 MG/DL (ref 8–22)
CALCIUM ALBUM COR SERPL-MCNC: 8.8 MG/DL (ref 8.5–10.5)
CALCIUM SERPL-MCNC: 9 MG/DL (ref 8.5–10.5)
CHLORIDE SERPL-SCNC: 108 MMOL/L (ref 96–112)
CHOLEST SERPL-MCNC: 165 MG/DL (ref 100–199)
CO2 SERPL-SCNC: 26 MMOL/L (ref 20–33)
COLOR UR: YELLOW
CREAT SERPL-MCNC: 0.62 MG/DL (ref 0.5–1.4)
EOSINOPHIL # BLD AUTO: 0.07 K/UL (ref 0–0.51)
EOSINOPHIL NFR BLD: 1.2 % (ref 0–6.9)
EPI CELLS #/AREA URNS HPF: NEGATIVE /HPF
ERYTHROCYTE [DISTWIDTH] IN BLOOD BY AUTOMATED COUNT: 48.5 FL (ref 35.9–50)
FASTING STATUS PATIENT QL REPORTED: NORMAL
GFR SERPLBLD CREATININE-BSD FMLA CKD-EPI: 90 ML/MIN/1.73 M 2
GLOBULIN SER CALC-MCNC: 2.7 G/DL (ref 1.9–3.5)
GLUCOSE SERPL-MCNC: 91 MG/DL (ref 65–99)
GLUCOSE UR STRIP.AUTO-MCNC: NEGATIVE MG/DL
HCT VFR BLD AUTO: 44.4 % (ref 37–47)
HDLC SERPL-MCNC: 76 MG/DL
HGB BLD-MCNC: 14.1 G/DL (ref 12–16)
HYALINE CASTS #/AREA URNS LPF: ABNORMAL /LPF
IMM GRANULOCYTES # BLD AUTO: 0.02 K/UL (ref 0–0.11)
IMM GRANULOCYTES NFR BLD AUTO: 0.4 % (ref 0–0.9)
KETONES UR STRIP.AUTO-MCNC: NEGATIVE MG/DL
LDLC SERPL CALC-MCNC: 77 MG/DL
LEUKOCYTE ESTERASE UR QL STRIP.AUTO: ABNORMAL
LYMPHOCYTES # BLD AUTO: 1.85 K/UL (ref 1–4.8)
LYMPHOCYTES NFR BLD: 32.9 % (ref 22–41)
MCH RBC QN AUTO: 31.8 PG (ref 27–33)
MCHC RBC AUTO-ENTMCNC: 31.8 G/DL (ref 32.2–35.5)
MCV RBC AUTO: 100 FL (ref 81.4–97.8)
MICRO URNS: ABNORMAL
MONOCYTES # BLD AUTO: 0.71 K/UL (ref 0–0.85)
MONOCYTES NFR BLD AUTO: 12.6 % (ref 0–13.4)
NEUTROPHILS # BLD AUTO: 2.93 K/UL (ref 1.82–7.42)
NEUTROPHILS NFR BLD: 52.2 % (ref 44–72)
NITRITE UR QL STRIP.AUTO: NEGATIVE
NRBC # BLD AUTO: 0 K/UL
NRBC BLD-RTO: 0 /100 WBC (ref 0–0.2)
PH UR STRIP.AUTO: 6.5 [PH] (ref 5–8)
PLATELET # BLD AUTO: 283 K/UL (ref 164–446)
PMV BLD AUTO: 10.4 FL (ref 9–12.9)
POTASSIUM SERPL-SCNC: 4.3 MMOL/L (ref 3.6–5.5)
PROT SERPL-MCNC: 6.9 G/DL (ref 6–8.2)
PROT UR QL STRIP: NEGATIVE MG/DL
RBC # BLD AUTO: 4.44 M/UL (ref 4.2–5.4)
RBC # URNS HPF: ABNORMAL /HPF
RBC UR QL AUTO: ABNORMAL
SODIUM SERPL-SCNC: 143 MMOL/L (ref 135–145)
SP GR UR STRIP.AUTO: 1.02
TRIGL SERPL-MCNC: 60 MG/DL (ref 0–149)
TSH SERPL DL<=0.005 MIU/L-ACNC: 1.8 UIU/ML (ref 0.38–5.33)
UROBILINOGEN UR STRIP.AUTO-MCNC: 0.2 MG/DL
WBC # BLD AUTO: 5.6 K/UL (ref 4.8–10.8)
WBC #/AREA URNS HPF: ABNORMAL /HPF

## 2023-07-24 PROCEDURE — 80061 LIPID PANEL: CPT

## 2023-07-24 PROCEDURE — 84443 ASSAY THYROID STIM HORMONE: CPT

## 2023-07-24 PROCEDURE — 80053 COMPREHEN METABOLIC PANEL: CPT

## 2023-07-24 PROCEDURE — 81001 URINALYSIS AUTO W/SCOPE: CPT

## 2023-07-24 PROCEDURE — 36415 COLL VENOUS BLD VENIPUNCTURE: CPT

## 2023-07-24 PROCEDURE — 82306 VITAMIN D 25 HYDROXY: CPT

## 2023-07-24 PROCEDURE — 85025 COMPLETE CBC W/AUTO DIFF WBC: CPT

## 2023-07-31 ENCOUNTER — HOSPITAL ENCOUNTER (OUTPATIENT)
Dept: RADIOLOGY | Facility: MEDICAL CENTER | Age: 78
End: 2023-07-31
Attending: FAMILY MEDICINE
Payer: MEDICARE

## 2023-07-31 DIAGNOSIS — Z12.31 ENCOUNTER FOR MAMMOGRAM TO ESTABLISH BASELINE MAMMOGRAM: ICD-10-CM

## 2023-07-31 PROCEDURE — 77063 BREAST TOMOSYNTHESIS BI: CPT

## 2023-08-10 ENCOUNTER — OFFICE VISIT (OUTPATIENT)
Dept: CARDIOLOGY | Facility: MEDICAL CENTER | Age: 78
End: 2023-08-10
Attending: STUDENT IN AN ORGANIZED HEALTH CARE EDUCATION/TRAINING PROGRAM
Payer: MEDICARE

## 2023-08-10 VITALS
HEART RATE: 74 BPM | SYSTOLIC BLOOD PRESSURE: 126 MMHG | RESPIRATION RATE: 16 BRPM | WEIGHT: 119 LBS | OXYGEN SATURATION: 97 % | DIASTOLIC BLOOD PRESSURE: 60 MMHG | BODY MASS INDEX: 23.99 KG/M2 | HEIGHT: 59 IN

## 2023-08-10 DIAGNOSIS — I49.3 PVC'S (PREMATURE VENTRICULAR CONTRACTIONS): ICD-10-CM

## 2023-08-10 DIAGNOSIS — R00.1 BRADYCARDIA: ICD-10-CM

## 2023-08-10 DIAGNOSIS — E78.5 DYSLIPIDEMIA: ICD-10-CM

## 2023-08-10 LAB — EKG IMPRESSION: NORMAL

## 2023-08-10 PROCEDURE — 99213 OFFICE O/P EST LOW 20 MIN: CPT | Performed by: STUDENT IN AN ORGANIZED HEALTH CARE EDUCATION/TRAINING PROGRAM

## 2023-08-10 PROCEDURE — 99204 OFFICE O/P NEW MOD 45 MIN: CPT | Performed by: STUDENT IN AN ORGANIZED HEALTH CARE EDUCATION/TRAINING PROGRAM

## 2023-08-10 PROCEDURE — 3078F DIAST BP <80 MM HG: CPT | Performed by: STUDENT IN AN ORGANIZED HEALTH CARE EDUCATION/TRAINING PROGRAM

## 2023-08-10 PROCEDURE — 3074F SYST BP LT 130 MM HG: CPT | Performed by: STUDENT IN AN ORGANIZED HEALTH CARE EDUCATION/TRAINING PROGRAM

## 2023-08-10 PROCEDURE — 93005 ELECTROCARDIOGRAM TRACING: CPT | Performed by: STUDENT IN AN ORGANIZED HEALTH CARE EDUCATION/TRAINING PROGRAM

## 2023-08-10 PROCEDURE — 93010 ELECTROCARDIOGRAM REPORT: CPT | Performed by: STUDENT IN AN ORGANIZED HEALTH CARE EDUCATION/TRAINING PROGRAM

## 2023-08-10 PROCEDURE — 99212 OFFICE O/P EST SF 10 MIN: CPT | Performed by: STUDENT IN AN ORGANIZED HEALTH CARE EDUCATION/TRAINING PROGRAM

## 2023-08-10 ASSESSMENT — ENCOUNTER SYMPTOMS
FEVER: 0
FOCAL WEAKNESS: 0
VOMITING: 0
DIZZINESS: 0
ORTHOPNEA: 0
DYSPNEA ON EXERTION: 0
WHEEZING: 0
PALPITATIONS: 0
NAUSEA: 0
COUGH: 0
SYNCOPE: 0
NEAR-SYNCOPE: 0
DIARRHEA: 0
PND: 0
SHORTNESS OF BREATH: 0
IRREGULAR HEARTBEAT: 0
WEAKNESS: 0
NIGHT SWEATS: 0
ABDOMINAL PAIN: 0

## 2023-08-10 ASSESSMENT — FIBROSIS 4 INDEX: FIB4 SCORE: 1.55

## 2023-08-10 NOTE — PROGRESS NOTES
Cardiology Initial Consultation Note    Date of note:    8/10/2023    Primary Care Provider: Anne Beltrán M.D.  Referring Provider: Olive Moran P.A.-C.     Patient Name: Estrella Peña   YOB: 1945  MRN:              [id number]    Chief Complaint: PVCs and bradycardia    History of Present Illness: Ms. Estrella Peña is a 78 y.o. female whose current medical problems include PVCs, and dyslipidemia who is here for cardiac consultation for PVCs and bradycardia.    The patient previously saw Dr. Bartlett, last seen 2/5/2019.  The patient was recently seen by her PCP, and thought to have bradycardia with heart rate 41.  As such, the patient was referred to cardiology for further workup.    [facility]  The patient reports feeling well today.  She denies any chest pain or shortness of breath on exertion, although does not exert much.  She denies any orthopnea, PND, leg swelling.  She denies any palpitations.  She denies any lightheadedness or syncope.    Cardiovascular Risk Factors:  1. Smoking status: Former smoker  2. Type II Diabetes Mellitus: Prediabetes, diet controlled  Lab Results   Component Value Date/Time    HBA1C 5.8 (H) 12/05/2022 07:14 AM    HBA1C 6.0 (H) 12/28/2018 07:47 AM     3. Hypertension: None  4. Dyslipidemia: On statin   Cholesterol,Tot   Date Value Ref Range Status   07/24/2023 165 100 - 199 mg/dL Final     LDL   Date Value Ref Range Status   07/24/2023 77 <100 mg/dL Final     HDL   Date Value Ref Range Status   07/24/2023 76 >=40 mg/dL Final     Triglycerides   Date Value Ref Range Status   07/24/2023 60 0 - 149 mg/dL Final     5. Family history of early Coronary Artery Disease in a first degree relative (Male less than 55 years of age; Female less than 65 years of age): None  6.  Obesity and/or Metabolic Syndrome: Body mass index is 24.04 kg/m².  7. Sedentary lifestyle: Not sedentary    Review of Systems   Constitutional: Negative  "for fever, malaise/fatigue and night sweats.   Cardiovascular:  Negative for chest pain, dyspnea on exertion, irregular heartbeat, leg swelling, near-syncope, orthopnea, palpitations, paroxysmal nocturnal dyspnea and syncope.   Respiratory:  Negative for cough, shortness of breath and wheezing.    Gastrointestinal:  Negative for abdominal pain, diarrhea, nausea and vomiting.   Neurological:  Negative for dizziness, focal weakness and weakness.         All other systems reviewed and are negative.     Current Outpatient Medications   Medication Sig Dispense Refill    ezetimibe (ZETIA) 10 MG Tab Take 10 mg by mouth every day.      celecoxib (CELEBREX) 200 MG Cap       Turmeric 500 MG Cap Take  by mouth.      Cyanocobalamin (VITAMIN B-12 PO) Take  by mouth.      donepezil (ARICEPT) 10 MG tablet Take 10 mg by mouth every day.      atorvastatin (LIPITOR) 10 MG Tab Take 10 mg by mouth every evening.      omeprazole (PRILOSEC) 20 MG delayed-release capsule Take 20 mg by mouth every day.      therapeutic multivitamin-minerals (THERAGRAN-M) Tab Take 1 Tab by mouth every day.      COVID-19 mRNA Vac-Miguel Angel,Pfizer, (PFIZER-BIONT COVID-19 VAC-MIGUEL ANGEL) 30 MCG/0.3ML Suspension injection Inject  into the shoulder, thigh, or buttocks. 0.3 mL 0    alendronate (FOSAMAX) 70 MG Tab Take 70 mg by mouth every 7 days. On Tue (Patient not taking: Reported on 12/13/2022)       No current facility-administered medications for this visit.         Allergies   Allergen Reactions    Nkda [No Known Drug Allergy]          Physical Exam:  Ambulatory Vitals  /60 (BP Location: Left arm, Patient Position: Sitting, BP Cuff Size: Adult)   Pulse 74   Resp 16   Ht 1.499 m (4' 11\")   Wt 54 kg (119 lb)   SpO2 97%    Oxygen Therapy:  Pulse Oximetry: 97 %  BP Readings from Last 4 Encounters:   08/10/23 126/60   06/20/23 (!) 153/64   04/25/23 128/72   12/13/22 136/78       Weight/BMI: Body mass index is 24.04 kg/m².  Wt Readings from Last 4 Encounters: "   08/10/23 54 kg (119 lb)   06/20/23 53.3 kg (117 lb 8.1 oz)   04/25/23 54.4 kg (120 lb)   12/13/22 53 kg (116 lb 13.5 oz)         General: Well appearing and in no apparent distress  Eyes: nl conjunctiva, no icteric sclera  ENT: normal external appearance of ears, nose, and throat  Neck: no visible JVP,  no carotid bruits  Lungs: normal respiratory effort, CTAB  Heart: RRR, no murmurs, no rubs or gallops,  no edema bilateral lower extremities. No LV/RV heave on cardiac palpatation. + bilateral radial pulses.  + bilateral dp pulses.   Abdomen: soft, non tender, non distended, no masses, normal bowel sounds.  No HSM.  Extremities/MSK: no clubbing, no cyanosis  Neurological: No focal sensory deficits  Psychiatric: Appropriate affect, A/O x 3, intact judgement and insight  Skin: Warm extremities      Lab Data Review:  Lab Results   Component Value Date/Time    CHOLSTRLTOT 165 07/24/2023 07:31 AM    LDL 77 07/24/2023 07:31 AM    HDL 76 07/24/2023 07:31 AM    TRIGLYCERIDE 60 07/24/2023 07:31 AM       Lab Results   Component Value Date/Time    SODIUM 143 07/24/2023 07:31 AM    POTASSIUM 4.3 07/24/2023 07:31 AM    CHLORIDE 108 07/24/2023 07:31 AM    CO2 26 07/24/2023 07:31 AM    GLUCOSE 91 07/24/2023 07:31 AM    BUN 12 07/24/2023 07:31 AM    CREATININE 0.62 07/24/2023 07:31 AM    CREATININE 0.8 03/20/2008 12:06 PM     Lab Results   Component Value Date/Time    ALKPHOSPHAT 60 07/24/2023 07:31 AM    ASTSGOT 29 07/24/2023 07:31 AM    ALTSGPT 28 07/24/2023 07:31 AM    TBILIRUBIN 0.3 07/24/2023 07:31 AM      Lab Results   Component Value Date/Time    WBC 5.6 07/24/2023 07:31 AM     Lab Results   Component Value Date/Time    HBA1C 5.8 (H) 12/05/2022 07:14 AM    HBA1C 6.0 (H) 12/28/2018 07:47 AM         Cardiac Imaging and Procedures Review:    EKG dated 8/10/2023: My personal interpretation is sinus rhythm, multiple PVCs    Echo dated 1/17/2019:   CONCLUSIONS  Compared to the images of the prior study done -  10-14-16, no    significant change.  Left ventricular ejection fraction is visually estimated to be 60%.  Mild tricuspid regurgitation.  Right ventricular systolic pressure is estimated to be 30 mmHg.    Nuclear Perfusion Imaging (1/17/2019):    NUCLEAR IMAGING INTERPRETATION   Physiologic apical thinning.   No myocardial infarct or reversible ischemia.   Calculated LEFT ventricular ejection fraction of 56%, felt to be    underestimated compared to digital inspection.      Sinus rhythm.   Nondiagnostic ECG portion of regadenoson nuclear stress test.   ECG INTERPRETATION   Nondiagnostic ECG portion of regadenoson nuclear stress test.    Holter Monitor (7/27/2017):   Final interpretation:   Underlying rhythm is sinus with a minimum heart rate of 52 bpm, average heart   rate of 75 beats on the maximum heart rate of 119 BPM.   Total ventricular ectopic beats 5.5%.   During symptoms underlying rhythm was sinus with ventricle ectopy.       Assessment & Plan     1. PVC's (premature ventricular contractions)  EKG    EC-ECHOCARDIOGRAM COMPLETE W/O CONT    Holter Monitor Study      2. Dyslipidemia        3. Bradycardia  EKG    EC-ECHOCARDIOGRAM COMPLETE W/O CONT    Holter Monitor Study            Shared Medical Decision Making:    PVCs  Bradycardia  PVC burden was 5.5% back in 2017.  EKG today with multiple PVCs. Patient largely asymtomatic  -Will plan for event monitor to assess for any bradyarrhythmias as well as PVC burden  -Obtain echocardiogram to assess LVEF and any structural abnormalities given frequent PVC history    Dyslipidemia  -Continue atorvastatin 10 mg daily    All of the patient's excellent questions were answered to the best of my knowledge and to her satisfaction.  It was a pleasure seeing Ms. Estrella Peña in my clinic today. Return in about 4 months (around 12/10/2023). Patient is aware to call the cardiology clinic with any questions or concerns.      Deandre Carrera MD  [facility]  [facility]  [address] [address]  Phone: [phone]  Fax: [phone]

## 2023-08-11 ENCOUNTER — NON-PROVIDER VISIT (OUTPATIENT)
Dept: CARDIOLOGY | Facility: MEDICAL CENTER | Age: 78
End: 2023-08-11
Attending: STUDENT IN AN ORGANIZED HEALTH CARE EDUCATION/TRAINING PROGRAM
Payer: MEDICARE

## 2023-08-11 DIAGNOSIS — I49.3 PVC'S (PREMATURE VENTRICULAR CONTRACTIONS): ICD-10-CM

## 2023-08-11 DIAGNOSIS — I49.3 FREQUENT PVCS: ICD-10-CM

## 2023-08-11 DIAGNOSIS — R00.1 BRADYCARDIA: ICD-10-CM

## 2023-08-11 DIAGNOSIS — I49.1 PREMATURE ATRIAL CONTRACTIONS: ICD-10-CM

## 2023-08-11 DIAGNOSIS — I47.10 SVT (SUPRAVENTRICULAR TACHYCARDIA) (HCC): ICD-10-CM

## 2023-08-11 NOTE — PROGRESS NOTES
Home enrollment completed for the 14 day [employer] Holter monitoring program per Deandre Carrera MD.  >Monitor mailed to patient by [employer]m.  >Currently pending EOS.

## 2023-09-06 ENCOUNTER — TELEPHONE (OUTPATIENT)
Dept: CARDIOLOGY | Facility: MEDICAL CENTER | Age: 78
End: 2023-09-06
Payer: MEDICARE

## 2023-09-06 PROCEDURE — 93248 EXT ECG>7D<15D REV&INTERPJ: CPT | Performed by: STUDENT IN AN ORGANIZED HEALTH CARE EDUCATION/TRAINING PROGRAM

## 2023-09-06 NOTE — TELEPHONE ENCOUNTER
Message  Received: Today  TRISTAN Vigil R.N.  Recommend starting metoprolol xl 12.5mg daily for frequent PVCs if she is having symptoms. If she doesn't have any symptoms, can hold off until next visit           Holter Monitor Study  ----------------------------------------------------------------------------------------------    See MyChart message

## 2023-09-21 ENCOUNTER — TELEPHONE (OUTPATIENT)
Dept: CARDIOLOGY | Facility: MEDICAL CENTER | Age: 78
End: 2023-09-21
Payer: MEDICARE

## 2023-09-21 NOTE — TELEPHONE ENCOUNTER
Called and spoke with patient.   Patient states she does not have any bothersome symptoms and will follow up at her scheduled appointment           Previous Messages       ----- Message -----   From: Libra Lindsey R.N.   Sent: 9/6/2023   To: Estrella Peña   Subject: Unread Message Notification                       Dr. eDbi Hogan reviewed your heart monitor results and states that it shows some early/extra heart beats that are typically harmless but can produce bothersome symptoms. If you are experiencing palpitations then Dr. Carrera recommends starting Metoprolol 12.5 mg daily to help suppress this. If you do not have symptoms, you can hold off until your next appointment. Please let us know.     Thank you,     Libra MURGUIA

## 2023-11-17 ENCOUNTER — HOSPITAL ENCOUNTER (OUTPATIENT)
Dept: CARDIOLOGY | Facility: MEDICAL CENTER | Age: 78
End: 2023-11-17
Attending: STUDENT IN AN ORGANIZED HEALTH CARE EDUCATION/TRAINING PROGRAM
Payer: MEDICARE

## 2023-11-17 DIAGNOSIS — I49.3 PVC'S (PREMATURE VENTRICULAR CONTRACTIONS): ICD-10-CM

## 2023-11-17 DIAGNOSIS — R00.1 BRADYCARDIA: ICD-10-CM

## 2023-11-17 LAB
LV EJECT FRACT MOD 2C 99903: 50.28
LV EJECT FRACT MOD 4C 99902: 64.37
LV EJECT FRACT MOD BP 99901: 57.5

## 2023-11-17 PROCEDURE — 93306 TTE W/DOPPLER COMPLETE: CPT

## 2023-11-17 PROCEDURE — 93306 TTE W/DOPPLER COMPLETE: CPT | Mod: 26 | Performed by: STUDENT IN AN ORGANIZED HEALTH CARE EDUCATION/TRAINING PROGRAM

## 2023-12-06 ENCOUNTER — OFFICE VISIT (OUTPATIENT)
Dept: CARDIOLOGY | Facility: MEDICAL CENTER | Age: 78
End: 2023-12-06
Attending: STUDENT IN AN ORGANIZED HEALTH CARE EDUCATION/TRAINING PROGRAM
Payer: MEDICARE

## 2023-12-06 VITALS
SYSTOLIC BLOOD PRESSURE: 110 MMHG | RESPIRATION RATE: 14 BRPM | HEART RATE: 74 BPM | WEIGHT: 118.6 LBS | HEIGHT: 59 IN | BODY MASS INDEX: 23.91 KG/M2 | DIASTOLIC BLOOD PRESSURE: 50 MMHG | OXYGEN SATURATION: 95 %

## 2023-12-06 DIAGNOSIS — I36.1 NONRHEUMATIC TRICUSPID VALVE REGURGITATION: ICD-10-CM

## 2023-12-06 DIAGNOSIS — I34.0 NONRHEUMATIC MITRAL VALVE REGURGITATION: ICD-10-CM

## 2023-12-06 DIAGNOSIS — I47.10 PAROXYSMAL SVT (SUPRAVENTRICULAR TACHYCARDIA) (HCC): ICD-10-CM

## 2023-12-06 DIAGNOSIS — E78.5 DYSLIPIDEMIA: ICD-10-CM

## 2023-12-06 DIAGNOSIS — I49.3 PVC'S (PREMATURE VENTRICULAR CONTRACTIONS): ICD-10-CM

## 2023-12-06 PROCEDURE — 99214 OFFICE O/P EST MOD 30 MIN: CPT | Performed by: STUDENT IN AN ORGANIZED HEALTH CARE EDUCATION/TRAINING PROGRAM

## 2023-12-06 PROCEDURE — 99212 OFFICE O/P EST SF 10 MIN: CPT | Performed by: STUDENT IN AN ORGANIZED HEALTH CARE EDUCATION/TRAINING PROGRAM

## 2023-12-06 PROCEDURE — 3074F SYST BP LT 130 MM HG: CPT | Performed by: STUDENT IN AN ORGANIZED HEALTH CARE EDUCATION/TRAINING PROGRAM

## 2023-12-06 PROCEDURE — 3078F DIAST BP <80 MM HG: CPT | Performed by: STUDENT IN AN ORGANIZED HEALTH CARE EDUCATION/TRAINING PROGRAM

## 2023-12-06 ASSESSMENT — ENCOUNTER SYMPTOMS
DIARRHEA: 0
FOCAL WEAKNESS: 0
NEAR-SYNCOPE: 0
DIZZINESS: 0
IRREGULAR HEARTBEAT: 0
FEVER: 0
WEAKNESS: 0
DYSPNEA ON EXERTION: 0
ABDOMINAL PAIN: 0
NIGHT SWEATS: 0
PND: 0
WHEEZING: 0
ORTHOPNEA: 0
SYNCOPE: 0
NAUSEA: 0
COUGH: 0
VOMITING: 0
SHORTNESS OF BREATH: 0
PALPITATIONS: 0

## 2023-12-06 ASSESSMENT — FIBROSIS 4 INDEX: FIB4 SCORE: 1.55

## 2023-12-06 NOTE — PROGRESS NOTES
Cardiology Follow-up Consultation Note    Date of note:   12/06/23  Primary Care Provider: Anne Beltrán M.D.    Patient Name: Estrella Peña   YOB: 1945  MRN:              [id number]    Chief Complaint: Follow-up PVCs     History of Present Illness: Ms. Estrella Peña is an 78 y.o. female whose current medical problems include PVCs, and dyslipidemia who is here for follow-up PVCs.    The patient was last seen in my clinic on 8/10/2023 (her initial visit with me, previously saw Dr. Bartlett, last seen 2/5/2019).  The patient was recently seen by her PCP, and thought to have bradycardia with heart rate 41.  As such, the patient was referred to cardiology for further workup.  The patient underwent an event monitor 9/2023, which showed 9 runs of SVT, frequent PVCs with PVC burden of 7.9% otherwise normal.  No evidence of pauses or bradycardia arrhythmias.  An echocardiogram was completed 11/17/2023, which showed normal LVEF, grade 1 diastolic dysfunction, mild MR, mild to moderate TR, otherwise normal.    The patient presents today for follow-up.  The patient reports feeling well today.  She denies any chest pain or shortness of breath on exertion, although does not exert much.  She denies any orthopnea, PND, or leg swelling.  She denies any palpitations.  She denies any lightheadedness or syncope.    Cardiovascular Risk Factors:  1. Smoking status: Former smoker  2. Type II Diabetes Mellitus: Prediabetes, diet controlled  Lab Results   Component Value Date/Time    HBA1C 5.8 (H) 12/05/2022 07:14 AM    HBA1C 6.0 (H) 12/28/2018 07:47 AM     3. Hypertension: None  4. Dyslipidemia: On statin   Cholesterol,Tot   Date Value Ref Range Status   07/24/2023 165 100 - 199 mg/dL Final     LDL   Date Value Ref Range Status   07/24/2023 77 <100 mg/dL Final     HDL   Date Value Ref Range Status   07/24/2023 76 >=40 mg/dL Final     Triglycerides   Date Value Ref Range Status   07/24/2023 60 0 - 149 mg/dL  Final     5. Family history of early Coronary Artery Disease in a first degree relative (Male less than 55 years of age; Female less than 65 years of age): None  6.  Obesity and/or Metabolic Syndrome: Body mass index is 23.95 kg/m².  7. Sedentary lifestyle: Not sedentary    Review of Systems   Constitutional: Negative for fever, malaise/fatigue and night sweats.   Cardiovascular:  Negative for chest pain, dyspnea on exertion, irregular heartbeat, leg swelling, near-syncope, orthopnea, palpitations, paroxysmal nocturnal dyspnea and syncope.   Respiratory:  Negative for cough, shortness of breath and wheezing.    Gastrointestinal:  Negative for abdominal pain, diarrhea, nausea and vomiting.   Neurological:  Negative for dizziness, focal weakness and weakness.         All other systems reviewed and are negative.     Current Outpatient Medications   Medication Sig Dispense Refill    Polyethylene Glycol 3350 (MIRALAX PO) Take  by mouth as needed.      ezetimibe (ZETIA) 10 MG Tab Take 10 mg by mouth every day.      celecoxib (CELEBREX) 200 MG Cap       Turmeric 500 MG Cap Take  by mouth.      Cyanocobalamin (VITAMIN B-12 PO) Take  by mouth.      donepezil (ARICEPT) 10 MG tablet Take 10 mg by mouth every day.      atorvastatin (LIPITOR) 10 MG Tab Take 10 mg by mouth every evening.      omeprazole (PRILOSEC) 20 MG delayed-release capsule Take 20 mg by mouth every day.      therapeutic multivitamin-minerals (THERAGRAN-M) Tab Take 1 Tab by mouth every day.      COVID-19 mRNA Vac-Miguel Angel,Pfizer, (PFIZER-BIONT COVID-19 VAC-MIGUEL ANGEL) 30 MCG/0.3ML Suspension injection Inject  into the shoulder, thigh, or buttocks. (Patient not taking: Reported on 12/6/2023) 0.3 mL 0    alendronate (FOSAMAX) 70 MG Tab Take 70 mg by mouth every 7 days. On Tue (Patient not taking: Reported on 12/13/2022)       No current facility-administered medications for this visit.         Allergies   Allergen Reactions    Nkda [No Known Drug Allergy]   "        Physical Exam:  Ambulatory Vitals  /50 (BP Location: Left arm, Patient Position: Sitting, BP Cuff Size: Adult)   Pulse 74   Resp 14   Ht 1.499 m (4' 11\")   Wt 53.8 kg (118 lb 9.6 oz)   SpO2 95%    Oxygen Therapy:  Pulse Oximetry: 95 %  BP Readings from Last 4 Encounters:   12/06/23 110/50   08/10/23 126/60   06/20/23 (!) 153/64   04/25/23 128/72       Weight/BMI: Body mass index is 23.95 kg/m².  Wt Readings from Last 4 Encounters:   12/06/23 53.8 kg (118 lb 9.6 oz)   08/10/23 54 kg (119 lb)   06/20/23 53.3 kg (117 lb 8.1 oz)   04/25/23 54.4 kg (120 lb)         General: Well appearing and in no apparent distress  Eyes: nl conjunctiva, no icteric sclera  ENT: normal external appearance of ears, nose, and throat  Neck: no visible JVP,  no carotid bruits  Lungs: normal respiratory effort, CTAB  Heart: RRR, no murmurs, no rubs or gallops,  no edema bilateral lower extremities. No LV/RV heave on cardiac palpatation. + bilateral radial pulses.  + bilateral dp pulses.   Abdomen: soft, non tender, non distended, no masses, normal bowel sounds.  No HSM.  Extremities/MSK: no clubbing, no cyanosis  Neurological: No focal sensory deficits  Psychiatric: Appropriate affect, A/O x 3, intact judgement and insight  Skin: Warm extremities      Lab Data Review:  Lab Results   Component Value Date/Time    CHOLSTRLTOT 165 07/24/2023 07:31 AM    LDL 77 07/24/2023 07:31 AM    HDL 76 07/24/2023 07:31 AM    TRIGLYCERIDE 60 07/24/2023 07:31 AM       Lab Results   Component Value Date/Time    SODIUM 143 07/24/2023 07:31 AM    POTASSIUM 4.3 07/24/2023 07:31 AM    CHLORIDE 108 07/24/2023 07:31 AM    CO2 26 07/24/2023 07:31 AM    GLUCOSE 91 07/24/2023 07:31 AM    BUN 12 07/24/2023 07:31 AM    CREATININE 0.62 07/24/2023 07:31 AM    CREATININE 0.8 03/20/2008 12:06 PM     Lab Results   Component Value Date/Time    ALKPHOSPHAT 60 07/24/2023 07:31 AM    ASTSGOT 29 07/24/2023 07:31 AM    ALTSGPT 28 07/24/2023 07:31 AM    TBILIRUBIN " 0.3 07/24/2023 07:31 AM      Lab Results   Component Value Date/Time    WBC 5.6 07/24/2023 07:31 AM    HEMOGLOBIN 14.1 07/24/2023 07:31 AM     Lab Results   Component Value Date/Time    HBA1C 5.8 (H) 12/05/2022 07:14 AM    HBA1C 6.0 (H) 12/28/2018 07:47 AM         Cardiac Imaging and Procedures Review:    EKG dated 8/10/2023: My personal interpretation is sinus rhythm, multiple PVCs    Echo dated 11/17/2023  CONCLUSIONS  Normal left ventricular systolic function.  The left ventricular   ejection fraction is visually estimated to be 55%.   Mild concentric left ventricular hypertrophy.  Grade I diastolic   dysfunction.  Normal right ventricular size and systolic function.  Mild mitral regurgitation.   Mild to moderate tricuspid regurgitation. Estimated right ventricular   systolic pressure is 30 mmHg.   Compared to the prior study on 1/17/19, there are no significant   changes.     Echo dated 1/17/2019:   CONCLUSIONS  Compared to the images of the prior study done -  10-14-16, no   significant change.  Left ventricular ejection fraction is visually estimated to be 60%.  Mild tricuspid regurgitation.  Right ventricular systolic pressure is estimated to be 30 mmHg.    Nuclear Perfusion Imaging (1/17/2019):    NUCLEAR IMAGING INTERPRETATION   Physiologic apical thinning.   No myocardial infarct or reversible ischemia.   Calculated LEFT ventricular ejection fraction of 56%, felt to be    underestimated compared to digital inspection.      Sinus rhythm.   Nondiagnostic ECG portion of regadenoson nuclear stress test.   ECG INTERPRETATION   Nondiagnostic ECG portion of regadenoson nuclear stress test.    Event monitor 9/2023  DOS: 8/17/2023-8/31/2023    Summary:    The patient was monitored for 13 days and 21 hours.  Predominant underlying rhythm was sinus rhythm.    9 runs of supraventricular tachycardia occurred, the fastest run lasting 4 beats with a max rate of 169 bpm, and the longest run lasting 11 beats with an  average rate of 126 bpm.  Frequent PVCs with PVC burden of 7.9%.  Rare PACs less than 1%.  No evidence of pauses or bradyarrhythmias.    Symptoms correlated with sinus rhythm with heart rate 93 bpm and PVCs.       Holter Monitor (7/27/2017):   Final interpretation:   Underlying rhythm is sinus with a minimum heart rate of 52 bpm, average heart   rate of 75 beats on the maximum heart rate of 119 BPM.   Total ventricular ectopic beats 5.5%.   During symptoms underlying rhythm was sinus with ventricle ectopy.       Assessment & Plan     1. PVC's (premature ventricular contractions)        2. Nonrheumatic mitral valve regurgitation        3. Nonrheumatic tricuspid valve regurgitation        4. Paroxysmal SVT (supraventricular tachycardia)        5. Dyslipidemia              Shared Medical Decision Making:    PVCs  Paroxysmal SVT  Recent event monitor showed SVT as well as frequent PVCs, no evidence of pauses or bradyarrhythmias.  Echocardiogram with preserved LVEF.  -Discussed starting metoprolol.  However, the patient reports that she has no symptoms, and would like to hold off any medications at this time, which I think is reasonable.  The patient also has PVC burden less than 10%, and LVEF is normal, as such, it is reasonable to hold off medications.  -Counseled on lifestyle changes such as remaining well dehydrated and decreasing caffeine intake.     Dyslipidemia  -Continue atorvastatin 10 mg daily    Mitral regurgitation  Tricuspid regurgitation  Euvolemic on exam  -Monitor via echocardiogram every 2 to 3 years.    All of the patient's excellent questions were answered to the best of my knowledge and to her satisfaction.  It was a pleasure seeing Ms. Estrella Peña in my clinic today. Return in 1 year (on 12/6/2024), or if symptoms worsen or fail to improve. Patient is aware to call the cardiology clinic with any questions or concerns.      Deandre Carrera MD  [facility]  [place] [facility], [facility]  [address] [address]  Phone: [phone]  Fax: [phone]

## 2023-12-21 ENCOUNTER — OUTPATIENT INFUSION SERVICES (OUTPATIENT)
Dept: ONCOLOGY | Facility: MEDICAL CENTER | Age: 78
End: 2023-12-21
Attending: FAMILY MEDICINE
Payer: MEDICARE

## 2023-12-21 VITALS
TEMPERATURE: 97.4 F | HEIGHT: 59 IN | HEART RATE: 76 BPM | WEIGHT: 117.5 LBS | BODY MASS INDEX: 23.69 KG/M2 | SYSTOLIC BLOOD PRESSURE: 153 MMHG | DIASTOLIC BLOOD PRESSURE: 83 MMHG | OXYGEN SATURATION: 96 % | RESPIRATION RATE: 16 BRPM

## 2023-12-21 DIAGNOSIS — M81.0 AGE RELATED OSTEOPOROSIS, UNSPECIFIED PATHOLOGICAL FRACTURE PRESENCE: ICD-10-CM

## 2023-12-21 LAB
CA-I BLD ISE-SCNC: 1.2 MMOL/L (ref 1.1–1.3)
CREAT BLD-MCNC: 0.7 MG/DL (ref 0.5–1.4)

## 2023-12-21 PROCEDURE — 96372 THER/PROPH/DIAG INJ SC/IM: CPT

## 2023-12-21 PROCEDURE — 82330 ASSAY OF CALCIUM: CPT

## 2023-12-21 PROCEDURE — 82565 ASSAY OF CREATININE: CPT

## 2023-12-21 PROCEDURE — 700111 HCHG RX REV CODE 636 W/ 250 OVERRIDE (IP): Mod: JZ | Performed by: FAMILY MEDICINE

## 2023-12-21 PROCEDURE — 36415 COLL VENOUS BLD VENIPUNCTURE: CPT

## 2023-12-21 RX ADMIN — DENOSUMAB 60 MG: 60 INJECTION SUBCUTANEOUS at 14:37

## 2023-12-21 ASSESSMENT — FIBROSIS 4 INDEX: FIB4 SCORE: 1.55

## 2023-12-22 NOTE — PROGRESS NOTES
Pt presents to infusion center for Prolia injection. Pt denies any current s/s of infection or recent or planned invasive dental surgery. Pt confirms they are taking appropriate amounts of calcium and Vit D at home and denies any s/s of hypocalcemia. Blood drawn from left forearm with 23G butterfly needle, gauze and coban dressing placed. ISTAT i-ca and creat run, results OK for treatment. Prolia given SubQ as ordered in back of arm with no s/s of adverse reaction, bandaid placed. Emailed schedulers for next appt, left on foot to self care.

## 2024-03-09 ENCOUNTER — HOSPITAL ENCOUNTER (OUTPATIENT)
Dept: RADIOLOGY | Facility: MEDICAL CENTER | Age: 79
End: 2024-03-09
Attending: FAMILY MEDICINE
Payer: MEDICARE

## 2024-03-09 DIAGNOSIS — M79.662 PAIN OF LEFT LOWER LEG: ICD-10-CM

## 2024-03-09 PROCEDURE — 73590 X-RAY EXAM OF LOWER LEG: CPT | Mod: LT

## 2024-03-15 PROBLEM — R73.03 PREDIABETES: Status: ACTIVE | Noted: 2024-03-15

## 2024-03-15 PROBLEM — M54.40 CHRONIC LOW BACK PAIN WITH SCIATICA: Status: ACTIVE | Noted: 2024-03-15

## 2024-03-15 PROBLEM — G89.29 CHRONIC LOW BACK PAIN WITH SCIATICA: Status: ACTIVE | Noted: 2024-03-15

## 2024-03-15 PROBLEM — R41.3 MEMORY LOSS: Status: ACTIVE | Noted: 2024-03-15

## 2024-03-15 PROBLEM — I47.10 PSVT (PAROXYSMAL SUPRAVENTRICULAR TACHYCARDIA) (HCC): Status: ACTIVE | Noted: 2024-03-15

## 2024-03-15 PROBLEM — K21.9 GASTROESOPHAGEAL REFLUX DISEASE WITHOUT ESOPHAGITIS: Status: ACTIVE | Noted: 2024-03-15

## 2024-03-22 ENCOUNTER — HOSPITAL ENCOUNTER (OUTPATIENT)
Dept: RADIOLOGY | Facility: MEDICAL CENTER | Age: 79
End: 2024-03-22
Attending: FAMILY MEDICINE
Payer: MEDICARE

## 2024-03-22 DIAGNOSIS — R22.42 LOCALIZED SWELLING, MASS AND LUMP, LOWER LIMB, LEFT: ICD-10-CM

## 2024-03-22 PROCEDURE — 93971 EXTREMITY STUDY: CPT | Mod: LT

## 2024-04-12 ENCOUNTER — APPOINTMENT (OUTPATIENT)
Dept: RADIOLOGY | Facility: MEDICAL CENTER | Age: 79
End: 2024-04-12
Attending: FAMILY MEDICINE
Payer: MEDICARE

## 2024-05-09 ENCOUNTER — APPOINTMENT (OUTPATIENT)
Dept: RADIOLOGY | Facility: MEDICAL CENTER | Age: 79
End: 2024-05-09
Attending: FAMILY MEDICINE
Payer: MEDICARE

## 2024-05-09 DIAGNOSIS — M54.50 LOW BACK PAIN, UNSPECIFIED BACK PAIN LATERALITY, UNSPECIFIED CHRONICITY, UNSPECIFIED WHETHER SCIATICA PRESENT: ICD-10-CM

## 2024-06-24 ENCOUNTER — OUTPATIENT INFUSION SERVICES (OUTPATIENT)
Dept: ONCOLOGY | Facility: MEDICAL CENTER | Age: 79
End: 2024-06-24
Attending: FAMILY MEDICINE
Payer: MEDICARE

## 2024-06-24 VITALS
WEIGHT: 114.64 LBS | HEART RATE: 77 BPM | HEIGHT: 59 IN | RESPIRATION RATE: 18 BRPM | TEMPERATURE: 98 F | SYSTOLIC BLOOD PRESSURE: 148 MMHG | BODY MASS INDEX: 23.11 KG/M2 | DIASTOLIC BLOOD PRESSURE: 76 MMHG | OXYGEN SATURATION: 98 %

## 2024-06-24 DIAGNOSIS — M81.0 AGE RELATED OSTEOPOROSIS, UNSPECIFIED PATHOLOGICAL FRACTURE PRESENCE: ICD-10-CM

## 2024-06-24 LAB
CA-I BLD ISE-SCNC: 1.16 MMOL/L (ref 1.1–1.3)
CREAT BLD-MCNC: 0.7 MG/DL (ref 0.5–1.4)

## 2024-06-24 PROCEDURE — 82330 ASSAY OF CALCIUM: CPT

## 2024-06-24 PROCEDURE — 96372 THER/PROPH/DIAG INJ SC/IM: CPT

## 2024-06-24 PROCEDURE — 36415 COLL VENOUS BLD VENIPUNCTURE: CPT

## 2024-06-24 PROCEDURE — 700111 HCHG RX REV CODE 636 W/ 250 OVERRIDE (IP): Mod: JZ | Performed by: FAMILY MEDICINE

## 2024-06-24 PROCEDURE — 82565 ASSAY OF CREATININE: CPT

## 2024-06-24 RX ADMIN — DENOSUMAB 60 MG: 60 INJECTION SUBCUTANEOUS at 13:45

## 2024-06-24 ASSESSMENT — FIBROSIS 4 INDEX: FIB4 SCORE: 1.57

## 2024-06-24 NOTE — PROGRESS NOTES
Patient came into INF independently. Orders and vitals signs reviewed, assessment done. Patient confirmed taking calcium and vitamin D supplements. Patient reports no signs or symptoms of hypocalcemia. Patient has not had oral surgery in past four weeks and does not have anything planned for future. Labs collected peripherally and reviewed. Patient meets parameters for injection. Prolia injected in back of left arm SubQ as ordered with no adverse events. Site covered with band aid. Patient left in stable condition. Scheduling emailed for next appointment.

## 2024-06-28 ENCOUNTER — HOSPITAL ENCOUNTER (OUTPATIENT)
Dept: LAB | Facility: MEDICAL CENTER | Age: 79
End: 2024-06-28
Attending: FAMILY MEDICINE
Payer: MEDICARE

## 2024-06-28 LAB
25(OH)D3 SERPL-MCNC: 62 NG/ML (ref 30–100)
ALBUMIN SERPL BCP-MCNC: 4.3 G/DL (ref 3.2–4.9)
ALBUMIN/GLOB SERPL: 1.6 G/DL
ALP SERPL-CCNC: 59 U/L (ref 30–99)
ALT SERPL-CCNC: 23 U/L (ref 2–50)
AMORPH CRY #/AREA URNS HPF: PRESENT /HPF
ANION GAP SERPL CALC-SCNC: 10 MMOL/L (ref 7–16)
APPEARANCE UR: CLEAR
AST SERPL-CCNC: 29 U/L (ref 12–45)
BACTERIA #/AREA URNS HPF: NEGATIVE /HPF
BASOPHILS # BLD AUTO: 0.7 % (ref 0–1.8)
BASOPHILS # BLD: 0.05 K/UL (ref 0–0.12)
BILIRUB SERPL-MCNC: 0.4 MG/DL (ref 0.1–1.5)
BILIRUB UR QL STRIP.AUTO: NEGATIVE
BUN SERPL-MCNC: 14 MG/DL (ref 8–22)
CALCIUM ALBUM COR SERPL-MCNC: 9.4 MG/DL (ref 8.5–10.5)
CALCIUM SERPL-MCNC: 9.6 MG/DL (ref 8.5–10.5)
CHLORIDE SERPL-SCNC: 104 MMOL/L (ref 96–112)
CHOLEST SERPL-MCNC: 186 MG/DL (ref 100–199)
CO2 SERPL-SCNC: 27 MMOL/L (ref 20–33)
COLOR UR: YELLOW
CREAT SERPL-MCNC: 0.6 MG/DL (ref 0.5–1.4)
EOSINOPHIL # BLD AUTO: 0.12 K/UL (ref 0–0.51)
EOSINOPHIL NFR BLD: 1.7 % (ref 0–6.9)
EPI CELLS #/AREA URNS HPF: NEGATIVE /HPF
ERYTHROCYTE [DISTWIDTH] IN BLOOD BY AUTOMATED COUNT: 51.3 FL (ref 35.9–50)
FASTING STATUS PATIENT QL REPORTED: NORMAL
GFR SERPLBLD CREATININE-BSD FMLA CKD-EPI: 90 ML/MIN/1.73 M 2
GLOBULIN SER CALC-MCNC: 2.7 G/DL (ref 1.9–3.5)
GLUCOSE SERPL-MCNC: 90 MG/DL (ref 65–99)
GLUCOSE UR STRIP.AUTO-MCNC: NEGATIVE MG/DL
HCT VFR BLD AUTO: 43.3 % (ref 37–47)
HDLC SERPL-MCNC: 93 MG/DL
HGB BLD-MCNC: 14 G/DL (ref 12–16)
HYALINE CASTS #/AREA URNS LPF: NORMAL /LPF
IMM GRANULOCYTES # BLD AUTO: 0.01 K/UL (ref 0–0.11)
IMM GRANULOCYTES NFR BLD AUTO: 0.1 % (ref 0–0.9)
KETONES UR STRIP.AUTO-MCNC: NEGATIVE MG/DL
LDLC SERPL CALC-MCNC: 80 MG/DL
LEUKOCYTE ESTERASE UR QL STRIP.AUTO: ABNORMAL
LYMPHOCYTES # BLD AUTO: 2.73 K/UL (ref 1–4.8)
LYMPHOCYTES NFR BLD: 39.3 % (ref 22–41)
MCH RBC QN AUTO: 33 PG (ref 27–33)
MCHC RBC AUTO-ENTMCNC: 32.3 G/DL (ref 32.2–35.5)
MCV RBC AUTO: 102.1 FL (ref 81.4–97.8)
MICRO URNS: ABNORMAL
MONOCYTES # BLD AUTO: 0.83 K/UL (ref 0–0.85)
MONOCYTES NFR BLD AUTO: 12 % (ref 0–13.4)
NEUTROPHILS # BLD AUTO: 3.2 K/UL (ref 1.82–7.42)
NEUTROPHILS NFR BLD: 46.2 % (ref 44–72)
NITRITE UR QL STRIP.AUTO: NEGATIVE
NRBC # BLD AUTO: 0 K/UL
NRBC BLD-RTO: 0 /100 WBC (ref 0–0.2)
PH UR STRIP.AUTO: 7 [PH] (ref 5–8)
PLATELET # BLD AUTO: 304 K/UL (ref 164–446)
PMV BLD AUTO: 10 FL (ref 9–12.9)
POTASSIUM SERPL-SCNC: 4.6 MMOL/L (ref 3.6–5.5)
PROT SERPL-MCNC: 7 G/DL (ref 6–8.2)
PROT UR QL STRIP: NEGATIVE MG/DL
RBC # BLD AUTO: 4.24 M/UL (ref 4.2–5.4)
RBC # URNS HPF: NORMAL /HPF
RBC UR QL AUTO: NEGATIVE
SODIUM SERPL-SCNC: 141 MMOL/L (ref 135–145)
SP GR UR STRIP.AUTO: 1.02
TRIGL SERPL-MCNC: 67 MG/DL (ref 0–149)
TSH SERPL DL<=0.005 MIU/L-ACNC: 2.99 UIU/ML (ref 0.38–5.33)
UROBILINOGEN UR STRIP.AUTO-MCNC: 0.2 MG/DL
WBC # BLD AUTO: 6.9 K/UL (ref 4.8–10.8)
WBC #/AREA URNS HPF: NORMAL /HPF

## 2024-06-28 PROCEDURE — 82306 VITAMIN D 25 HYDROXY: CPT

## 2024-06-28 PROCEDURE — 36415 COLL VENOUS BLD VENIPUNCTURE: CPT

## 2024-06-28 PROCEDURE — 84443 ASSAY THYROID STIM HORMONE: CPT

## 2024-06-28 PROCEDURE — 80053 COMPREHEN METABOLIC PANEL: CPT

## 2024-06-28 PROCEDURE — 80061 LIPID PANEL: CPT

## 2024-06-28 PROCEDURE — 81001 URINALYSIS AUTO W/SCOPE: CPT

## 2024-06-28 PROCEDURE — 85025 COMPLETE CBC W/AUTO DIFF WBC: CPT

## 2024-07-10 ENCOUNTER — APPOINTMENT (RX ONLY)
Dept: URBAN - METROPOLITAN AREA CLINIC 4 | Facility: CLINIC | Age: 79
Setting detail: DERMATOLOGY
End: 2024-07-10

## 2024-07-10 DIAGNOSIS — L57.8 OTHER SKIN CHANGES DUE TO CHRONIC EXPOSURE TO NONIONIZING RADIATION: ICD-10-CM

## 2024-07-10 DIAGNOSIS — L82.0 INFLAMED SEBORRHEIC KERATOSIS: ICD-10-CM

## 2024-07-10 DIAGNOSIS — D18.0 HEMANGIOMA: ICD-10-CM

## 2024-07-10 DIAGNOSIS — L81.4 OTHER MELANIN HYPERPIGMENTATION: ICD-10-CM

## 2024-07-10 DIAGNOSIS — L82.1 OTHER SEBORRHEIC KERATOSIS: ICD-10-CM

## 2024-07-10 PROBLEM — D18.01 HEMANGIOMA OF SKIN AND SUBCUTANEOUS TISSUE: Status: ACTIVE | Noted: 2024-07-10

## 2024-07-10 PROCEDURE — 17110 DESTRUCTION B9 LES UP TO 14: CPT

## 2024-07-10 PROCEDURE — ? LIQUID NITROGEN

## 2024-07-10 PROCEDURE — 99213 OFFICE O/P EST LOW 20 MIN: CPT | Mod: 25

## 2024-07-10 PROCEDURE — ? COUNSELING

## 2024-07-10 ASSESSMENT — LOCATION SIMPLE DESCRIPTION DERM
LOCATION SIMPLE: LEFT POSTERIOR THIGH
LOCATION SIMPLE: UPPER BACK
LOCATION SIMPLE: LEFT FOREARM
LOCATION SIMPLE: RIGHT UPPER ARM
LOCATION SIMPLE: LEFT FOREHEAD
LOCATION SIMPLE: RIGHT POSTERIOR THIGH
LOCATION SIMPLE: RIGHT UPPER BACK
LOCATION SIMPLE: LEFT UPPER ARM
LOCATION SIMPLE: LEFT BREAST
LOCATION SIMPLE: LEFT CHEEK
LOCATION SIMPLE: RIGHT FOREARM

## 2024-07-10 ASSESSMENT — LOCATION DETAILED DESCRIPTION DERM
LOCATION DETAILED: LEFT CENTRAL MALAR CHEEK
LOCATION DETAILED: RIGHT PROXIMAL RADIAL DORSAL FOREARM
LOCATION DETAILED: LEFT MEDIAL BREAST 8-9:00 REGION
LOCATION DETAILED: LEFT SUPERIOR LATERAL MALAR CHEEK
LOCATION DETAILED: RIGHT MID-UPPER BACK
LOCATION DETAILED: SUPERIOR THORACIC SPINE
LOCATION DETAILED: RIGHT DISTAL POSTERIOR THIGH
LOCATION DETAILED: LEFT DISTAL POSTERIOR THIGH
LOCATION DETAILED: LEFT ANTERIOR DISTAL UPPER ARM
LOCATION DETAILED: RIGHT ANTERIOR DISTAL UPPER ARM
LOCATION DETAILED: INFERIOR THORACIC SPINE
LOCATION DETAILED: LEFT MEDIAL FOREHEAD
LOCATION DETAILED: LEFT PROXIMAL DORSAL FOREARM

## 2024-07-10 ASSESSMENT — LOCATION ZONE DERM
LOCATION ZONE: LEG
LOCATION ZONE: FACE
LOCATION ZONE: ARM
LOCATION ZONE: TRUNK

## 2024-08-01 ENCOUNTER — HOSPITAL ENCOUNTER (OUTPATIENT)
Dept: RADIOLOGY | Facility: MEDICAL CENTER | Age: 79
End: 2024-08-01
Attending: FAMILY MEDICINE
Payer: MEDICARE

## 2024-08-01 DIAGNOSIS — Z12.31 SCREENING MAMMOGRAM FOR BREAST CANCER: ICD-10-CM

## 2024-08-01 PROCEDURE — 77067 SCR MAMMO BI INCL CAD: CPT

## 2024-12-06 ENCOUNTER — HOSPITAL ENCOUNTER (OUTPATIENT)
Dept: LAB | Facility: MEDICAL CENTER | Age: 79
End: 2024-12-06
Attending: FAMILY MEDICINE
Payer: MEDICARE

## 2024-12-06 LAB
25(OH)D3 SERPL-MCNC: 58 NG/ML (ref 30–100)
ALBUMIN SERPL BCP-MCNC: 4.2 G/DL (ref 3.2–4.9)
ALBUMIN/GLOB SERPL: 1.7 G/DL
ALP SERPL-CCNC: 57 U/L (ref 30–99)
ALT SERPL-CCNC: 21 U/L (ref 2–50)
ANION GAP SERPL CALC-SCNC: 8 MMOL/L (ref 7–16)
AST SERPL-CCNC: 31 U/L (ref 12–45)
BASOPHILS # BLD AUTO: 0.8 % (ref 0–1.8)
BASOPHILS # BLD: 0.05 K/UL (ref 0–0.12)
BILIRUB SERPL-MCNC: 0.4 MG/DL (ref 0.1–1.5)
BUN SERPL-MCNC: 11 MG/DL (ref 8–22)
CALCIUM ALBUM COR SERPL-MCNC: 9.2 MG/DL (ref 8.5–10.5)
CALCIUM SERPL-MCNC: 9.4 MG/DL (ref 8.5–10.5)
CHLORIDE SERPL-SCNC: 105 MMOL/L (ref 96–112)
CHOLEST SERPL-MCNC: 187 MG/DL (ref 100–199)
CO2 SERPL-SCNC: 28 MMOL/L (ref 20–33)
CREAT SERPL-MCNC: 0.73 MG/DL (ref 0.5–1.4)
EOSINOPHIL # BLD AUTO: 0.13 K/UL (ref 0–0.51)
EOSINOPHIL NFR BLD: 2.1 % (ref 0–6.9)
ERYTHROCYTE [DISTWIDTH] IN BLOOD BY AUTOMATED COUNT: 47.6 FL (ref 35.9–50)
FASTING STATUS PATIENT QL REPORTED: NORMAL
GFR SERPLBLD CREATININE-BSD FMLA CKD-EPI: 82 ML/MIN/1.73 M 2
GLOBULIN SER CALC-MCNC: 2.5 G/DL (ref 1.9–3.5)
GLUCOSE SERPL-MCNC: 93 MG/DL (ref 65–99)
HCT VFR BLD AUTO: 43.6 % (ref 37–47)
HDLC SERPL-MCNC: 88 MG/DL
HGB BLD-MCNC: 13.9 G/DL (ref 12–16)
IMM GRANULOCYTES # BLD AUTO: 0.02 K/UL (ref 0–0.11)
IMM GRANULOCYTES NFR BLD AUTO: 0.3 % (ref 0–0.9)
LDLC SERPL CALC-MCNC: 84 MG/DL
LYMPHOCYTES # BLD AUTO: 2.53 K/UL (ref 1–4.8)
LYMPHOCYTES NFR BLD: 40 % (ref 22–41)
MCH RBC QN AUTO: 31.8 PG (ref 27–33)
MCHC RBC AUTO-ENTMCNC: 31.9 G/DL (ref 32.2–35.5)
MCV RBC AUTO: 99.8 FL (ref 81.4–97.8)
MONOCYTES # BLD AUTO: 0.74 K/UL (ref 0–0.85)
MONOCYTES NFR BLD AUTO: 11.7 % (ref 0–13.4)
NEUTROPHILS # BLD AUTO: 2.86 K/UL (ref 1.82–7.42)
NEUTROPHILS NFR BLD: 45.1 % (ref 44–72)
NRBC # BLD AUTO: 0 K/UL
NRBC BLD-RTO: 0 /100 WBC (ref 0–0.2)
PLATELET # BLD AUTO: 283 K/UL (ref 164–446)
PMV BLD AUTO: 10.2 FL (ref 9–12.9)
POTASSIUM SERPL-SCNC: 4.2 MMOL/L (ref 3.6–5.5)
PROT SERPL-MCNC: 6.7 G/DL (ref 6–8.2)
RBC # BLD AUTO: 4.37 M/UL (ref 4.2–5.4)
SODIUM SERPL-SCNC: 141 MMOL/L (ref 135–145)
TRIGL SERPL-MCNC: 73 MG/DL (ref 0–149)
TSH SERPL-ACNC: 3.53 UIU/ML (ref 0.35–5.5)
WBC # BLD AUTO: 6.3 K/UL (ref 4.8–10.8)

## 2024-12-06 PROCEDURE — 85025 COMPLETE CBC W/AUTO DIFF WBC: CPT

## 2024-12-06 PROCEDURE — 80061 LIPID PANEL: CPT

## 2024-12-06 PROCEDURE — 82306 VITAMIN D 25 HYDROXY: CPT

## 2024-12-06 PROCEDURE — 80053 COMPREHEN METABOLIC PANEL: CPT

## 2024-12-06 PROCEDURE — 36415 COLL VENOUS BLD VENIPUNCTURE: CPT

## 2024-12-06 PROCEDURE — 84443 ASSAY THYROID STIM HORMONE: CPT

## 2024-12-20 ENCOUNTER — HOSPITAL ENCOUNTER (OUTPATIENT)
Dept: RADIOLOGY | Facility: MEDICAL CENTER | Age: 79
End: 2024-12-20
Attending: FAMILY MEDICINE
Payer: MEDICARE

## 2024-12-20 DIAGNOSIS — M81.0 AGE-RELATED OSTEOPOROSIS WITHOUT CURRENT PATHOLOGICAL FRACTURE: ICD-10-CM

## 2024-12-20 PROCEDURE — 77080 DXA BONE DENSITY AXIAL: CPT

## 2025-01-07 ENCOUNTER — OUTPATIENT INFUSION SERVICES (OUTPATIENT)
Dept: ONCOLOGY | Facility: MEDICAL CENTER | Age: 80
End: 2025-01-07
Attending: FAMILY MEDICINE
Payer: MEDICARE

## 2025-01-07 VITALS
DIASTOLIC BLOOD PRESSURE: 69 MMHG | OXYGEN SATURATION: 93 % | RESPIRATION RATE: 17 BRPM | SYSTOLIC BLOOD PRESSURE: 133 MMHG | HEART RATE: 81 BPM | BODY MASS INDEX: 22.48 KG/M2 | HEIGHT: 61 IN | TEMPERATURE: 97.6 F | WEIGHT: 119.05 LBS

## 2025-01-07 DIAGNOSIS — M81.0 AGE-RELATED OSTEOPOROSIS WITHOUT CURRENT PATHOLOGICAL FRACTURE: ICD-10-CM

## 2025-01-07 LAB
CA-I BLD ISE-SCNC: 1.19 MMOL/L (ref 1.1–1.3)
CREAT BLD-MCNC: 0.8 MG/DL (ref 0.5–1.4)

## 2025-01-07 PROCEDURE — 82330 ASSAY OF CALCIUM: CPT

## 2025-01-07 PROCEDURE — 36415 COLL VENOUS BLD VENIPUNCTURE: CPT

## 2025-01-07 PROCEDURE — 82565 ASSAY OF CREATININE: CPT

## 2025-01-07 PROCEDURE — 700111 HCHG RX REV CODE 636 W/ 250 OVERRIDE (IP): Mod: JZ,TB | Performed by: FAMILY MEDICINE

## 2025-01-07 PROCEDURE — 96372 THER/PROPH/DIAG INJ SC/IM: CPT

## 2025-01-07 RX ADMIN — DENOSUMAB 60 MG: 60 INJECTION SUBCUTANEOUS at 13:40

## 2025-01-07 ASSESSMENT — FIBROSIS 4 INDEX: FIB4 SCORE: 1.94

## 2025-01-07 NOTE — PROGRESS NOTES
Estrella presented into [facility]fusion [facility] for a Prolia injection for osteoporosis.  Pt denied having any new complaints, acute infections, or dental procedures in the last month or scheduled for the next month. 25G butterfly needle used to draw blood from left AC, bleeding controlled with gauze and Coban. IStat calcium and creatinine tested, WNL, pharmacist notified that pt within parameters to treat. Estrella aware to continue taking vitamin D and calcium supplements. Prolia injection given in upper back of left arm SQ. Pt tolerated well, band-aid applied to injection site. Schedulers emailed about future appointments. Pt discharged to home in good condition.

## 2025-03-21 ENCOUNTER — HOSPITAL ENCOUNTER (OUTPATIENT)
Facility: MEDICAL CENTER | Age: 80
End: 2025-03-21
Attending: UROLOGY
Payer: MEDICARE

## 2025-03-21 PROCEDURE — 87086 URINE CULTURE/COLONY COUNT: CPT

## 2025-03-23 LAB
BACTERIA UR CULT: NORMAL
SIGNIFICANT IND 70042: NORMAL
SITE SITE: NORMAL
SOURCE SOURCE: NORMAL

## 2025-04-23 ENCOUNTER — HOSPITAL ENCOUNTER (OUTPATIENT)
Facility: MEDICAL CENTER | Age: 80
End: 2025-04-23
Payer: MEDICARE

## 2025-04-23 PROCEDURE — 81001 URINALYSIS AUTO W/SCOPE: CPT

## 2025-04-24 LAB
APPEARANCE UR: CLEAR
BACTERIA #/AREA URNS HPF: ABNORMAL /HPF
BILIRUB UR QL STRIP.AUTO: NEGATIVE
CASTS URNS QL MICRO: ABNORMAL /LPF (ref 0–2)
COLOR UR: YELLOW
EPITHELIAL CELLS 1715: ABNORMAL /HPF (ref 0–5)
GLUCOSE UR STRIP.AUTO-MCNC: NEGATIVE MG/DL
KETONES UR STRIP.AUTO-MCNC: NEGATIVE MG/DL
LEUKOCYTE ESTERASE UR QL STRIP.AUTO: ABNORMAL
MICRO URNS: ABNORMAL
NITRITE UR QL STRIP.AUTO: NEGATIVE
PH UR STRIP.AUTO: 6 [PH] (ref 5–8)
PROT UR QL STRIP: NEGATIVE MG/DL
RBC # URNS HPF: ABNORMAL /HPF (ref 0–2)
RBC UR QL AUTO: NEGATIVE
SP GR UR STRIP.AUTO: 1.01
UROBILINOGEN UR STRIP.AUTO-MCNC: 0.2 EU/DL
WBC #/AREA URNS HPF: ABNORMAL /HPF

## 2025-07-08 ENCOUNTER — OUTPATIENT INFUSION SERVICES (OUTPATIENT)
Dept: ONCOLOGY | Facility: MEDICAL CENTER | Age: 80
End: 2025-07-08
Attending: FAMILY MEDICINE
Payer: MEDICARE

## 2025-07-08 VITALS
OXYGEN SATURATION: 94 % | HEART RATE: 77 BPM | HEIGHT: 58 IN | TEMPERATURE: 97 F | WEIGHT: 120.81 LBS | DIASTOLIC BLOOD PRESSURE: 69 MMHG | SYSTOLIC BLOOD PRESSURE: 139 MMHG | BODY MASS INDEX: 25.36 KG/M2 | RESPIRATION RATE: 18 BRPM

## 2025-07-08 DIAGNOSIS — M81.0 AGE-RELATED OSTEOPOROSIS WITHOUT CURRENT PATHOLOGICAL FRACTURE: Primary | ICD-10-CM

## 2025-07-08 LAB
CA-I BLD ISE-SCNC: 1.18 MMOL/L (ref 1.1–1.3)
CREAT BLD-MCNC: 0.7 MG/DL (ref 0.5–1.4)

## 2025-07-08 PROCEDURE — 96372 THER/PROPH/DIAG INJ SC/IM: CPT

## 2025-07-08 PROCEDURE — 82330 ASSAY OF CALCIUM: CPT

## 2025-07-08 PROCEDURE — 36415 COLL VENOUS BLD VENIPUNCTURE: CPT

## 2025-07-08 PROCEDURE — 700111 HCHG RX REV CODE 636 W/ 250 OVERRIDE (IP): Mod: JZ,TB | Performed by: FAMILY MEDICINE

## 2025-07-08 PROCEDURE — 82565 ASSAY OF CREATININE: CPT

## 2025-07-08 RX ADMIN — DENOSUMAB 60 MG: 60 INJECTION SUBCUTANEOUS at 13:59

## 2025-07-08 ASSESSMENT — FIBROSIS 4 INDEX: FIB4 SCORE: 1.96

## 2025-07-08 NOTE — PROGRESS NOTES
Pt arrived ambulatory for Q 6 month Prolia injection. She denies any s/s acute infection or illness, denies dental procedures in the past 4 weeks or upcoming dental procedures, denies s/s of hypocalcemia.  Pt confirms taking calcuim/vitamin D at home.    Istat Ca/Cr lab drawn from right AC, sterile gauze and coban to site.  Lab parameters met, Prolia injected Sub Q to back right arm per MAR, bandaid applied to site.  Pt tolerated well, discharged in no apparent distress, next appointment scheduled and confirmed, printout of appt given to pt.

## 2025-08-20 ENCOUNTER — HOSPITAL ENCOUNTER (OUTPATIENT)
Facility: MEDICAL CENTER | Age: 80
End: 2025-08-20
Attending: FAMILY MEDICINE
Payer: MEDICARE

## 2025-08-20 VITALS — WEIGHT: 120 LBS | BODY MASS INDEX: 24.19 KG/M2 | HEIGHT: 59 IN

## 2025-08-20 DIAGNOSIS — N64.4 MASTODYNIA: ICD-10-CM

## 2025-08-20 PROCEDURE — G0279 TOMOSYNTHESIS, MAMMO: HCPCS

## 2025-08-20 PROCEDURE — 76642 ULTRASOUND BREAST LIMITED: CPT | Mod: LT

## 2025-08-20 ASSESSMENT — FIBROSIS 4 INDEX: FIB4 SCORE: 1.96
